# Patient Record
Sex: FEMALE | Race: WHITE | Employment: UNEMPLOYED | URBAN - METROPOLITAN AREA
[De-identification: names, ages, dates, MRNs, and addresses within clinical notes are randomized per-mention and may not be internally consistent; named-entity substitution may affect disease eponyms.]

---

## 2017-02-10 ENCOUNTER — HOSPITAL ENCOUNTER (OUTPATIENT)
Dept: MAMMOGRAPHY | Age: 52
Discharge: HOME | End: 2017-02-10
Attending: NURSE PRACTITIONER
Payer: MEDICAID

## 2017-02-10 DIAGNOSIS — N60.11 FIBROCYSTIC BREAST CHANGES OF BOTH BREASTS: ICD-10-CM

## 2017-02-10 DIAGNOSIS — N60.12 FIBROCYSTIC BREAST CHANGES OF BOTH BREASTS: ICD-10-CM

## 2017-02-10 DIAGNOSIS — Z98.82 H/O BILATERAL BREAST IMPLANTS: ICD-10-CM

## 2017-02-10 DIAGNOSIS — Z80.3 FAMILY HISTORY OF BREAST CANCER: ICD-10-CM

## 2017-02-10 PROCEDURE — 77067 SCR MAMMO BI INCL CAD: CPT

## 2017-03-02 ENCOUNTER — TELEPHONE (OUTPATIENT)
Dept: SURGERY | Age: 52
End: 2017-03-02

## 2017-03-02 NOTE — TELEPHONE ENCOUNTER
Patient called and left message on nurse voicemail asking for her 2/10/17 mammogram results. I called her back to let her know this was normal. She is requesting me to fax a copy to her obgyn, Dr. Krissy Amezcua. Fax #[phone]. I faxed a copy and received confirmation.

## 2017-08-31 ENCOUNTER — OFFICE VISIT (OUTPATIENT)
Dept: SURGERY | Age: 52
End: 2017-08-31

## 2017-08-31 VITALS
SYSTOLIC BLOOD PRESSURE: 157 MMHG | HEIGHT: 67 IN | WEIGHT: 152 LBS | BODY MASS INDEX: 23.86 KG/M2 | HEART RATE: 74 BPM | DIASTOLIC BLOOD PRESSURE: 103 MMHG

## 2017-08-31 DIAGNOSIS — Z80.3 FAMILY HISTORY OF BREAST CANCER: ICD-10-CM

## 2017-08-31 DIAGNOSIS — Z98.82 H/O BILATERAL BREAST IMPLANTS: ICD-10-CM

## 2017-08-31 DIAGNOSIS — N60.11 FIBROCYSTIC BREAST CHANGES OF BOTH BREASTS: Primary | ICD-10-CM

## 2017-08-31 DIAGNOSIS — N60.12 FIBROCYSTIC BREAST CHANGES OF BOTH BREASTS: Primary | ICD-10-CM

## 2017-08-31 NOTE — PROGRESS NOTES
HISTORY OF PRESENT ILLNESS  Pedro Lim is a 46 y.o. female. HPI  ESTABLISHED patient here today for high risk follow up due to family history of breast cancer. Denies any breast problems at this time.      Breast history-  - BILATERAL breast implants    FH includes-  Mother diagnosed with breast cancer at age 62, survivor. Maternal aunt diagnosed with breast cancer at age 61, . Maternal grandmother diagnosed with breast cancer at age [de-identified], . Patient has had genetic testing- BRCA negative. 2016- calculated risk using the Tyrer Cuzick model. Her 10 year risk is 7%(compared with a population risk of 2.7%). Her lifetime risk is 27.7% (compared with a population risk of 11.4%). OB History      Para Term  AB Living    2 2        SAB TAB Ectopic Molar Multiple Live Births                 Obstetric Comments    Menarche:  13. LMP: current. # of Children:  2. Age at Delivery of First Child:  35.   Hysterectomy/oophorectomy:  NO/NO. Breast Bx:  no.  Hx of Breast Feeding:  yes. BCP:  Yes, IUD. Hormone therapy:  no.             Past Surgical History:   Procedure Laterality Date    BREAST SURGERY PROCEDURE UNLISTED      IMPLANTS    HX OTHER SURGICAL      spinal fusion    IMPLANT BREAST SILICONE/EQ Bilateral          Recent imaging-  Bilateral screening mammogram on 2/10/17- BIRADS 2  IMPRESSION:  BI-RADS 2: Benign. No mammographic evidence of malignancy. Bilateral breast  implants remain intact. Next screening mammogram is recommended in one year. The patient will be  notified of these results. ROS    Physical Exam   Constitutional: She appears well-developed and well-nourished. Pulmonary/Chest: Right breast exhibits no inverted nipple, no mass, no nipple discharge, no skin change and no tenderness. Left breast exhibits no inverted nipple, no mass, no nipple discharge, no skin change and no tenderness.  Breasts are symmetrical.   Musculoskeletal: Normal range of motion. UE x 2   Lymphadenopathy:     She has no cervical adenopathy. She has no axillary adenopathy. Right: No supraclavicular adenopathy present. Left: No supraclavicular adenopathy present. Skin: Skin is warm, dry and intact. Psychiatric: She has a normal mood and affect. Her speech is normal and behavior is normal.     Visit Vitals    BP (!) 157/103    Pulse 74    Ht 5' 7\" (1.702 m)    Wt 152 lb (68.9 kg)    BMI 23.81 kg/m2     ASSESSMENT and PLAN  Encounter Diagnoses   Name Primary?  Fibrocystic breast changes of both breasts Yes    Family history of breast cancer     H/O bilateral breast implants      Normal exam and imaging with no evidence of breast malignancy. She will continue to have annual mammograms. She will not have screening MRIs, but will have additional breast imaging PRN. She will plan to have a BSmammogram in 2/2018 and RTC here in 1 year. She inquired about a derm recommendation - given the names of Dr. Merced Gonzalez and Dr. Morris Backers.

## 2017-08-31 NOTE — PATIENT INSTRUCTIONS
Breast Self-Exam: Care Instructions  Your Care Instructions  A breast self-exam is when you check your breasts for lumps or changes. This regular exam helps you learn how your breasts normally look and feel. Most breast problems or changes are not because of cancer. Breast self-exam is not a substitute for a mammogram. Having regular breast exams by your doctor and regular mammograms improve your chances of finding any problems with your breasts. Some women set a time each month to do a step-by-step breast self-exam. Other women like a less formal system. They might look at their breasts as they brush their teeth, or feel their breasts once in a while in the shower. If you notice a change in your breast, tell your doctor. Follow-up care is a key part of your treatment and safety. Be sure to make and go to all appointments, and call your doctor if you are having problems. Its also a good idea to know your test results and keep a list of the medicines you take. How do you do a breast self-exam?  · The best time to examine your breasts is usually one week after your menstrual period begins. Your breasts should not be tender then. If you do not have periods, you might do your exam on a day of the month that is easy to remember. · To examine your breasts:  ¨ Remove all your clothes above the waist and lie down. When you are lying down, your breast tissue spreads evenly over your chest wall, which makes it easier to feel all your breast tissue. ¨ Use the pads--not the fingertips--of the 3 middle fingers of your left hand to check your right breast. Move your fingers slowly in small coin-sized circles that overlap. ¨ Use three levels of pressure to feel of all your breast tissue. Use light pressure to feel the tissue close to the skin surface. Use medium pressure to feel a little deeper. Use firm pressure to feel your tissue close to your breastbone and ribs.  Use each pressure level to feel your breast tissue before moving on to the next spot. ¨ Check your entire breast, moving up and down as if following a strip from the collarbone to the bra line, and from the armpit to the ribs. Repeat until you have covered the entire breast.  ¨ Repeat this procedure for your left breast, using the pads of the 3 middle fingers of your right hand. · To examine your breasts while in the shower:  ¨ Place one arm over your head and lightly soap your breast on that side. ¨ Using the pads of your fingers, gently move your hand over your breast (in the strip pattern described above), feeling carefully for any lumps or changes. ¨ Repeat for the other breast.  · Have your doctor inspect anything you notice to see if you need further testing. Where can you learn more? Go to http://pau-olivia.info/. Enter P148 in the search box to learn more about \"Breast Self-Exam: Care Instructions. \"  Current as of: July 26, 2016  Content Version: 11.3  © 1733-9573 [employer]. Care instructions adapted under license by [employer] (which disclaims liability or warranty for this information). If you have questions about a medical condition or this instruction, always ask your healthcare professional. [place] [address] any warranty or liability for your use of this information.

## 2017-08-31 NOTE — PROGRESS NOTES
HISTORY OF PRESENT ILLNESS  Lia Eduardo is a 46 y.o. female. HPI   ESTABLISHED patient here today for high risk follow up due to family history of breast cancer. Denies any breast problems at this time.      Breast history-  - BILATERAL breast implants    FH includes-  Mother diagnosed with breast cancer at age 62, survivor. Maternal aunt diagnosed with breast cancer at age 61, . Maternal grandmother diagnosed with breast cancer at age [de-identified], . Patient has had genetic testing- BRCA negative. 2016- calculated risk using the Tyrer Cuzick model. Her 10 year risk is 7%(compared with a population risk of 2.7%). Her lifetime risk is 27.7% (compared with a population risk of 11.4%). Recent imaging-  Bilateral screening mammogram on 2/10/17- BIRADS 2  IMPRESSION:  BI-RADS 2: Benign. No mammographic evidence of malignancy. Bilateral breast  implants remain intact. Next screening mammogram is recommended in one year. The patient will be  notified of these results.     ROS    Physical Exam    ASSESSMENT and PLAN  {ASSESSMENT/PLAN:05721}

## 2018-08-27 ENCOUNTER — OFFICE VISIT (OUTPATIENT)
Dept: GYNECOLOGY | Age: 53
End: 2018-08-27

## 2018-08-27 VITALS
DIASTOLIC BLOOD PRESSURE: 102 MMHG | SYSTOLIC BLOOD PRESSURE: 167 MMHG | WEIGHT: 158.6 LBS | BODY MASS INDEX: 24.89 KG/M2 | HEART RATE: 66 BPM | HEIGHT: 67 IN

## 2018-08-27 DIAGNOSIS — N83.8 OVARIAN MASS, RIGHT: Primary | ICD-10-CM

## 2018-08-27 DIAGNOSIS — Z87.42 HISTORY OF ABNORMAL CERVICAL PAP SMEAR: ICD-10-CM

## 2018-08-27 NOTE — PROGRESS NOTES
New patient referred by Dr. Bora Benoit for right adnexal mass, pt complains of discomfort since the exam and ultrasound, pt is unable to sleep and it is hard to walk, Initial blood pressure reading 178/100, repeat blood pressure reading 167/102      1. Have you been to the ER, urgent care clinic since your last visit? Hospitalized since your last visit?  no    2. Have you seen or consulted any other health care providers outside of the [address] [place] [address] since your last visit? Include any pap smears or colon screening.    Yes, Dr. Bora Benoit

## 2018-08-27 NOTE — PATIENT INSTRUCTIONS
Fyreball Activation    Thank you for requesting access to Fyreball. Please follow the instructions below to securely access and download your online medical record. Fyreball allows you to send messages to your doctor, view your test results, renew your prescriptions, schedule appointments, and more. How Do I Sign Up? 1. In your internet browser, go to [email or web address]. 2. Click on the First Time User? Click Here link in the Sign In box. You will see the New Member Sign Up page. 3. Enter your Fyreball Access Code exactly as it appears below. You will not need to use this code after youve completed the sign-up process. If you do not sign up before the expiration date, you must request a new code. Fyreball Access Code: [email or web address]  Expires: 2018 10:47 AM (This is the date your Fyreball access code will )    4. Enter the last four digits of your Social Security Number (xxxx) and Date of Birth (mm/dd/yyyy) as indicated and click Submit. You will be taken to the next sign-up page. 5. Create a Fyreball ID. This will be your Fyreball login ID and cannot be changed, so think of one that is secure and easy to remember. 6. Create a Fyreball password. You can change your password at any time. 7. Enter your Password Reset Question and Answer. This can be used at a later time if you forget your password. 8. Enter your e-mail address. You will receive e-mail notification when new information is available in [address]. 9. Click Sign Up. You can now view and download portions of your medical record. 10. Click the Download Summary menu link to download a portable copy of your medical information. Additional Information    If you have questions, please visit the Frequently Asked Questions section of the Fyreball website at [email or web address] Remember, Fyreball is NOT to be used for urgent needs. For medical emergencies, dial 911.

## 2018-08-27 NOTE — PROGRESS NOTES
[address] [place] [address] [address] [facility]  P [phone]  F [phone]    Office Note  Patient ID:  Name:  Yadira Young  MRN:  [id number]  :  1965/53 y.o. Date:  2018      HISTORY OF PRESENT ILLNESS:  Yadira Young is a 48 y.o.  perimenopausal female who presents in consultation for enlarging pelvic mass. Pelvic ultrasound on 18 demonstrated a 16.3 x 7.8 x 10.0cm right ovarian mass with \"multiple septations\". The patient originally presented to Dr. Laine Castillo reporting increasing RLQ pain and bloating on 18. Patient reports she had an episode of pelvic pain 2 weeks ago while she was on vacation, that was somewhat relieved by urination. However, she felt like something was just not right, and decided she needed to see her Gynecologist. She reports some weight gain over the last 3 years. Denies any vaginal bleeding for a number of years. Denies change in appetite, satiety, or bowel habits. Denies hematuria or hematochezia. Denies CP or SOB. Pertinent PMH/PSH: hypertension, history of spinal fusion      Active, no restrictions. ROS:  A comprehensive review of systems was negative except for that written in the History of Present Illness. , 10 point ROS    OB/GYN ROS:   Patient denies significant menstrual problems. Patient denies any abnormal bleeding or vaginal discharge.      ECOG ndGndrndanddndend:nd nd2nd Problem List:  Patient Active Problem List    Diagnosis Date Noted    Ovarian mass, right 2018    History of abnormal cervical Pap smear 2018    Scoliosis     Chronic pain     Arthritis      PMH:  Past Medical History:   Diagnosis Date    Arthritis     Chronic pain     Nausea & vomiting     Nerve compression     neck and left arm    Ovarian cyst     Pap smear abnormality of cervix 2008    + HPV, laser treatment for cervix in , colpo  negative    Pap smear abnormality of cervix 2009    +HPV    Pap smear abnormality of cervix 09/13/2010    +HPV (16,18 negative)    Pap smear abnormality of cervix 01/2011    ASCUS, HPV +, repap in 8/11 and 10/13 - WNL    Psychiatric disorder     Anxiety    Scoliosis       PSH:  Past Surgical History:   Procedure Laterality Date    BREAST SURGERY PROCEDURE UNLISTED  1997    IMPLANTS    CKC, AKA COLD KNIFE CONE  1992    HX GYN  2008    laser treatment of cervix    HX OTHER SURGICAL  2012    spinal fusion    HX WISDOM TEETH EXTRACTION      IMPLANT BREAST SILICONE/EQ Bilateral     1997      Social History:  Social History   Substance Use Topics    Smoking status: Former Smoker    Smokeless tobacco: Never Used    Alcohol use 1.8 oz/week     3 Standard drinks or equivalent per week      Comment: OCCASIONALLY      Family History:  Family History   Problem Relation Age of Onset    Cancer Mother     Breast Cancer Mother 64    Cancer Father     Seizures Sister     Breast Cancer Maternal Grandmother      [de-identified]    Breast Cancer Maternal Aunt      60's      Medications: (reviewed)  Current Outpatient Prescriptions   Medication Sig    ibuprofen (ADVIL PO) Take  by mouth. No current facility-administered medications for this visit. Allergies: (reviewed)  No Known Allergies     Gyn History:   Last pap: normal 11/2015  History of abnormal pap: yes, last abnormal in 1/2011 (ASCUS, HPV +); f/u 8/2011 and 10/2013 normal  History of STI: yes, HPV  Menses: IUD in place  Contraception: Mirena IUD (1/28/11 - keeping in until menopause)    OBJECTIVE:    Physical Exam:  VITAL SIGNS: Vitals:    08/27/18 1047 08/27/18 1052   BP: (!) 178/100 (!) 167/102   Pulse: 68 66   Weight: 158 lb 9.6 oz (71.9 kg)    Height: 5' 7\" (1.702 m)      Body mass index is 24.84 kg/(m^2). GENERAL AYAD: Conversant, alert, oriented. No acute distress. HEENT: HEENT. No thyroid enlargement. No JVD. Neck: Supple without restrictions. RESPIRATORY: Clear to auscultation and percussion to the bases.  No CVAT. CARDIOVASC: RRR without murmur/rub. GASTROINT: soft, nondistended, no masses palpated, without organomegaly, slight tenderness; patient self guarding prior to even touching her abdomen   MUSCULOSKEL: no joint tenderness, deformity or swelling       EXTREMITIES: extremities normal, atraumatic, no cyanosis or edema   PELVIC: Vulva and vagina appear normal. Normal appearing cervix with IUD strings visualized. Bimanual exam reveals normal uterus. Patient largely intolerant of bimanual exam, thus could not palpate mass. RECTAL: deferred   VICTORIA SURVEY: No suspicious lymphadenopathy or edema noted. NEURO: Grossly intact. No acute deficit. Lab Date as available:  Ca-125 (8/23/18) = 12.6    Imaging:  Pelvic ultrasound 8/23/18:   Impression: normal sized post emnopausal uterus with correctly positioned IUD. Large ~14cm complex right adnexa mass with multiple septations, no free fluid or ascites noted, left adnexae appears normal.    IMPRESSION/PLAN:    Vinay Flaherty is a 48 y.o. female with a working diagnosis of complex right ovarian mass; normal Ca-125 (12.6)    Problems:     Patient Active Problem List    Diagnosis Date Noted    Ovarian mass, right 08/27/2018    History of abnormal cervical Pap smear 08/27/2018    Scoliosis     Chronic pain     Arthritis        I reviewed Ms. Vinay Flaherty course to date, including her medical records, recent studies, physical exam, and review of symptoms. Given complex appearance and size, recommend surgical removal. Discussed laparoscopic versus open surgery. Will plan for CT A/P for further evaluation and to help aid in decision regarding laparoscopic versus open surgery. If appears amenable to laparoscopy, will plan for diagnostic laparoscopy with USO versus BSO with frozen pathology.  If frozen pathology consistent with borderline or malignancy, will plan on staging as indicated including TLH/BSO, possible pelvic/para-aortic LND, possible omentectomy, possible debulking as indicated. If benign, reviewed the role of contralateral ovarian preservation. Reviewed cardiovascular, bone, and colon health with ovarian preservation. However, given the patient's age (48 years old), this added benefit may be low. The patient is not at increased risk of endometrial pathology in the future compared to the general population, although I discussed the possibility of a hysterectomy at the time of surgery even if the mass is benign. At this time the patient is unsure about proceeding with a hysterectomy and BSO at the time of removing the right ovarian mass. It is reasonable to perform a TLH/BSO even if the mass is benign given her age and history of abnormal pap smears. Will readdress again on the day of surgery. Will collect CEA today as well, along with CBCD, CMP, EKG, CXR, and CT A/P. If CEA is elevated, will consider colonoscopy prior to surgery; otherwise, recommend colonscopy after recovery. Posted for diagnostic laparoscopy, USO, etc on 9/6/18. All questions and concerns were addressed with the patient and she is comfortable with the plan.       Defined Sensitive Document    >50% of total time allocated to visit dedicated to counseling, 60 minutes total.    Signed By: Hussein Rushing MD     8/27/2018/10:47 AM

## 2018-08-27 NOTE — LETTER
8/27/2018 11:59 AM 
 
Patient:  Neva Driscoll YOB: 1965 Date of Visit: 8/27/2018 Ольга Oh MD 
[address] [facility] [address] VIA In Basket Dear Ольга Oh MD, Thank you for referring Ms. Neva Driscoll to [address] [place] [address] for evaluation and treatment. Below are the relevant portions of my assessment and plan of care. Ms. Neva Driscoll has a complex appearing, 16-cm right ovarian mass with a normal Ca-125. I have recommended surgical removal. I have posted her for surgery on Thursday, September 6, 2018. I am obtaining a CT abdomen/pelvis prior to surgery, along with a CEA. I am planning on performing a diagnostic laparoscopy with USO and possible staging if a borderline tumor or malignant tumor are present on frozen pathology. I have also offered the patient a hysterectomy and BSO even if the mass is benign, and the patient is discussing this with her family. Thank you very much for your referral of Ms. Neva Driscoll. If you have questions, please do not hesitate to call me. I look forward to following Ms. Marveen Homans along with you and will keep you updated as to her progress.   
 
 
 
 
Sincerely, 
 
 
Jolanda Oppenheim, MD

## 2018-08-29 ENCOUNTER — HOSPITAL ENCOUNTER (OUTPATIENT)
Dept: PREADMISSION TESTING | Age: 53
Discharge: HOME | End: 2018-08-29
Payer: MEDICAID

## 2018-08-29 ENCOUNTER — TELEPHONE (OUTPATIENT)
Dept: SURGERY | Age: 53
End: 2018-08-29

## 2018-08-29 VITALS
BODY MASS INDEX: 24.48 KG/M2 | HEIGHT: 67 IN | WEIGHT: 156 LBS | SYSTOLIC BLOOD PRESSURE: 127 MMHG | TEMPERATURE: 98.3 F | DIASTOLIC BLOOD PRESSURE: 76 MMHG | HEART RATE: 62 BPM

## 2018-08-29 LAB
ABO + RH BLD: NORMAL
ALBUMIN SERPL-MCNC: 4 G/DL (ref 3.5–5)
ALBUMIN/GLOB SERPL: 1.1 {RATIO} (ref 1.1–2.2)
ALP SERPL-CCNC: 67 U/L (ref 45–117)
ALT SERPL-CCNC: 22 U/L (ref 12–78)
ANION GAP SERPL CALC-SCNC: 6 MMOL/L (ref 5–15)
AST SERPL-CCNC: 15 U/L (ref 15–37)
BASOPHILS # BLD: 0 K/UL (ref 0–0.1)
BASOPHILS NFR BLD: 0 % (ref 0–1)
BILIRUB SERPL-MCNC: 0.3 MG/DL (ref 0.2–1)
BLOOD GROUP ANTIBODIES SERPL: NORMAL
BUN SERPL-MCNC: 18 MG/DL (ref 6–20)
BUN/CREAT SERPL: 24 (ref 12–20)
CALCIUM SERPL-MCNC: 9.5 MG/DL (ref 8.5–10.1)
CEA SERPL-MCNC: 0.5 NG/ML
CHLORIDE SERPL-SCNC: 106 MMOL/L (ref 97–108)
CO2 SERPL-SCNC: 25 MMOL/L (ref 21–32)
CREAT SERPL-MCNC: 0.74 MG/DL (ref 0.55–1.02)
DIFFERENTIAL METHOD BLD: NORMAL
EOSINOPHIL # BLD: 0.1 K/UL (ref 0–0.4)
EOSINOPHIL NFR BLD: 2 % (ref 0–7)
ERYTHROCYTE [DISTWIDTH] IN BLOOD BY AUTOMATED COUNT: 12.8 % (ref 11.5–14.5)
GLOBULIN SER CALC-MCNC: 3.7 G/DL (ref 2–4)
GLUCOSE SERPL-MCNC: 88 MG/DL (ref 65–100)
HCT VFR BLD AUTO: 38.2 % (ref 35–47)
HGB BLD-MCNC: 12.5 G/DL (ref 11.5–16)
IMM GRANULOCYTES # BLD: 0 K/UL (ref 0–0.04)
IMM GRANULOCYTES NFR BLD AUTO: 0 % (ref 0–0.5)
LYMPHOCYTES # BLD: 1.2 K/UL (ref 0.8–3.5)
LYMPHOCYTES NFR BLD: 23 % (ref 12–49)
MCH RBC QN AUTO: 31.6 PG (ref 26–34)
MCHC RBC AUTO-ENTMCNC: 32.7 G/DL (ref 30–36.5)
MCV RBC AUTO: 96.5 FL (ref 80–99)
MONOCYTES # BLD: 0.3 K/UL (ref 0–1)
MONOCYTES NFR BLD: 7 % (ref 5–13)
NEUTS SEG # BLD: 3.5 K/UL (ref 1.8–8)
NEUTS SEG NFR BLD: 67 % (ref 32–75)
NRBC # BLD: 0 K/UL (ref 0–0.01)
NRBC BLD-RTO: 0 PER 100 WBC
PLATELET # BLD AUTO: 281 K/UL (ref 150–400)
PMV BLD AUTO: 9.1 FL (ref 8.9–12.9)
POTASSIUM SERPL-SCNC: 4.3 MMOL/L (ref 3.5–5.1)
PROT SERPL-MCNC: 7.7 G/DL (ref 6.4–8.2)
RBC # BLD AUTO: 3.96 M/UL (ref 3.8–5.2)
SODIUM SERPL-SCNC: 137 MMOL/L (ref 136–145)
SPECIMEN EXP DATE BLD: NORMAL
WBC # BLD AUTO: 5.1 K/UL (ref 3.6–11)

## 2018-08-29 PROCEDURE — 36415 COLL VENOUS BLD VENIPUNCTURE: CPT | Performed by: OBSTETRICS & GYNECOLOGY

## 2018-08-29 PROCEDURE — 82378 CARCINOEMBRYONIC ANTIGEN: CPT | Performed by: OBSTETRICS & GYNECOLOGY

## 2018-08-29 PROCEDURE — 86900 BLOOD TYPING SEROLOGIC ABO: CPT | Performed by: OBSTETRICS & GYNECOLOGY

## 2018-08-29 PROCEDURE — 85025 COMPLETE CBC W/AUTO DIFF WBC: CPT | Performed by: OBSTETRICS & GYNECOLOGY

## 2018-08-29 PROCEDURE — 80053 COMPREHEN METABOLIC PANEL: CPT | Performed by: OBSTETRICS & GYNECOLOGY

## 2018-08-29 PROCEDURE — 93005 ELECTROCARDIOGRAM TRACING: CPT

## 2018-08-29 NOTE — TELEPHONE ENCOUNTER
Patient called to notify our office that she is currently being treated for an ovarian mass. Has surgery scheduled for next week. Everything is in [facility]. She apologized for canceling her appointment with Radha Gregg NP, for tomorrow 8/30/18, but wanted to know how she should follow up with her breast/high risk screening. She is aware that she is overdue for a mammogram.    I explained to the patient that I will discuss with Leland Ortez NP, and call the patient back tomorrow with an update/plan. The patient is very appreciative.

## 2018-08-29 NOTE — PERIOP NOTES
PREOPERATIVE INSTRUCTIONS REVIEWED WITH PATIENT. PATIENT GIVEN TWO-- SIX PACKS OF CHG WIPES. INSTRUCTIONS REVIEWED ON USE OF CHG WIPES. PATIENT GIVEN SSI INFECTIONS SHEET. PATIENT WAS GIVEN THE OPPORTUNITY TO ASK QUESTIONS ON THE INFORMATION PROVIDED. PATIENT VERBALIZES UNDERSTANDING OF PREOP PREP:  BOWEL PREP PER DR Hua Pal.

## 2018-08-30 LAB
ATRIAL RATE: 55 BPM
CALCULATED P AXIS, ECG09: 18 DEGREES
CALCULATED R AXIS, ECG10: 9 DEGREES
CALCULATED T AXIS, ECG11: 50 DEGREES
DIAGNOSIS, 93000: NORMAL
P-R INTERVAL, ECG05: 148 MS
Q-T INTERVAL, ECG07: 462 MS
QRS DURATION, ECG06: 78 MS
QTC CALCULATION (BEZET), ECG08: 441 MS
VENTRICULAR RATE, ECG03: 55 BPM

## 2018-08-30 NOTE — TELEPHONE ENCOUNTER
I called the patient and gave her this information. She voiced understanding and is happy with this plan.

## 2018-08-31 ENCOUNTER — HOSPITAL ENCOUNTER (OUTPATIENT)
Dept: CT IMAGING | Age: 53
Discharge: HOME | End: 2018-08-31
Attending: OBSTETRICS & GYNECOLOGY
Payer: MEDICAID

## 2018-08-31 ENCOUNTER — TELEPHONE (OUTPATIENT)
Dept: GYNECOLOGY | Age: 53
End: 2018-08-31

## 2018-08-31 DIAGNOSIS — N83.8 OVARIAN MASS, RIGHT: ICD-10-CM

## 2018-08-31 PROCEDURE — 74011000255 HC RX REV CODE- 255: Performed by: OBSTETRICS & GYNECOLOGY

## 2018-08-31 PROCEDURE — 74177 CT ABD & PELVIS W/CONTRAST: CPT

## 2018-08-31 PROCEDURE — 74011250636 HC RX REV CODE- 250/636: Performed by: OBSTETRICS & GYNECOLOGY

## 2018-08-31 PROCEDURE — 74011636320 HC RX REV CODE- 636/320: Performed by: OBSTETRICS & GYNECOLOGY

## 2018-08-31 RX ORDER — BARIUM SULFATE 20 MG/ML
900 SUSPENSION ORAL
Status: COMPLETED | OUTPATIENT
Start: 2018-08-31 | End: 2018-08-31

## 2018-08-31 RX ORDER — SODIUM CHLORIDE 9 MG/ML
50 INJECTION, SOLUTION INTRAVENOUS
Status: COMPLETED | OUTPATIENT
Start: 2018-08-31 | End: 2018-08-31

## 2018-08-31 RX ORDER — SODIUM CHLORIDE 0.9 % (FLUSH) 0.9 %
10 SYRINGE (ML) INJECTION
Status: COMPLETED | OUTPATIENT
Start: 2018-08-31 | End: 2018-08-31

## 2018-08-31 RX ADMIN — Medication 10 ML: at 13:34

## 2018-08-31 RX ADMIN — IOPAMIDOL 100 ML: 755 INJECTION, SOLUTION INTRAVENOUS at 13:33

## 2018-08-31 RX ADMIN — SODIUM CHLORIDE 50 ML/HR: 900 INJECTION, SOLUTION INTRAVENOUS at 13:34

## 2018-08-31 RX ADMIN — BARIUM SULFATE 900 ML: 21 SUSPENSION ORAL at 13:34

## 2018-08-31 NOTE — TELEPHONE ENCOUNTER
Please call pt regarding her CT scan results, she would like to have them discussed before her surgery on 9/6/18. She may be reached at [phone].

## 2018-09-04 NOTE — TELEPHONE ENCOUNTER
I spoke with Ms. Abdirahman De Los Snatos today regarding her CT results. Her CT confirms her complex pelvic mass, but does not demonstrate any other concerning abdominal findings. Will proceed with diagnostic laparoscopy and laparoscopic removal of the mass. Further discussed the role of hysterectomy and BSO. Reviewed the risks and benefits of hysterectomy and ovarian preservation. At this time, the patient would like to move forward with just the removal of the mass. If frozen pathology is consistent with borderline or malignancy, will plan to move forward with TLH/BSO/staging. If it is benign, will plan to move forward with just laparoscopic USO/removal of pelvic mass. The patient was thankful for our call.      Mary Lou Grullon MD

## 2018-09-05 ENCOUNTER — ANESTHESIA EVENT (OUTPATIENT)
Dept: SURGERY | Age: 53
End: 2018-09-05
Payer: MEDICAID

## 2018-09-06 ENCOUNTER — ANESTHESIA (OUTPATIENT)
Dept: SURGERY | Age: 53
End: 2018-09-06
Payer: MEDICAID

## 2018-09-06 ENCOUNTER — HOSPITAL ENCOUNTER (OUTPATIENT)
Age: 53
Setting detail: OUTPATIENT SURGERY
Discharge: HOME | End: 2018-09-06
Attending: OBSTETRICS & GYNECOLOGY | Admitting: OBSTETRICS & GYNECOLOGY
Payer: MEDICAID

## 2018-09-06 VITALS
DIASTOLIC BLOOD PRESSURE: 65 MMHG | BODY MASS INDEX: 24.48 KG/M2 | SYSTOLIC BLOOD PRESSURE: 119 MMHG | TEMPERATURE: 97.1 F | WEIGHT: 156 LBS | HEART RATE: 65 BPM | OXYGEN SATURATION: 99 % | HEIGHT: 67 IN | RESPIRATION RATE: 12 BRPM

## 2018-09-06 DIAGNOSIS — G89.18 POSTOPERATIVE PAIN: Primary | ICD-10-CM

## 2018-09-06 PROBLEM — D27.0 CYSTADENOMA OF RIGHT OVARY: Status: ACTIVE | Noted: 2018-09-06

## 2018-09-06 LAB — HCG UR QL: NEGATIVE

## 2018-09-06 PROCEDURE — 77030002933 HC SUT MCRYL J&J -A: Performed by: OBSTETRICS & GYNECOLOGY

## 2018-09-06 PROCEDURE — 74011250636 HC RX REV CODE- 250/636: Performed by: ANESTHESIOLOGY

## 2018-09-06 PROCEDURE — 77030035048 HC TRCR ENDOSC OPTCL COVD -B: Performed by: OBSTETRICS & GYNECOLOGY

## 2018-09-06 PROCEDURE — 77030008684 HC TU ET CUF COVD -B: Performed by: ANESTHESIOLOGY

## 2018-09-06 PROCEDURE — 74011250636 HC RX REV CODE- 250/636: Performed by: OBSTETRICS & GYNECOLOGY

## 2018-09-06 PROCEDURE — 76210000016 HC OR PH I REC 1 TO 1.5 HR: Performed by: OBSTETRICS & GYNECOLOGY

## 2018-09-06 PROCEDURE — 74011250636 HC RX REV CODE- 250/636

## 2018-09-06 PROCEDURE — 74011000250 HC RX REV CODE- 250: Performed by: OBSTETRICS & GYNECOLOGY

## 2018-09-06 PROCEDURE — 77030020263 HC SOL INJ SOD CL0.9% LFCR 1000ML: Performed by: OBSTETRICS & GYNECOLOGY

## 2018-09-06 PROCEDURE — 88307 TISSUE EXAM BY PATHOLOGIST: CPT | Performed by: OBSTETRICS & GYNECOLOGY

## 2018-09-06 PROCEDURE — 77030035045 HC TRCR ENDOSC VRSPRT BLDLSS COVD -B: Performed by: OBSTETRICS & GYNECOLOGY

## 2018-09-06 PROCEDURE — 88112 CYTOPATH CELL ENHANCE TECH: CPT | Performed by: OBSTETRICS & GYNECOLOGY

## 2018-09-06 PROCEDURE — 81025 URINE PREGNANCY TEST: CPT

## 2018-09-06 PROCEDURE — 77030034696 HC CATH URETH FOL 2W BARD -A: Performed by: OBSTETRICS & GYNECOLOGY

## 2018-09-06 PROCEDURE — 77030011640 HC PAD GRND REM COVD -A: Performed by: OBSTETRICS & GYNECOLOGY

## 2018-09-06 PROCEDURE — 88331 PATH CONSLTJ SURG 1 BLK 1SPC: CPT | Performed by: OBSTETRICS & GYNECOLOGY

## 2018-09-06 PROCEDURE — 76210000021 HC REC RM PH II 0.5 TO 1 HR: Performed by: OBSTETRICS & GYNECOLOGY

## 2018-09-06 PROCEDURE — 76060000034 HC ANESTHESIA 1.5 TO 2 HR: Performed by: OBSTETRICS & GYNECOLOGY

## 2018-09-06 PROCEDURE — 77030002903 HC SUT DEV PLCMNT LSIS -C: Performed by: OBSTETRICS & GYNECOLOGY

## 2018-09-06 PROCEDURE — 74011000258 HC RX REV CODE- 258: Performed by: OBSTETRICS & GYNECOLOGY

## 2018-09-06 PROCEDURE — 77030033639 HC SHR ENDO COAG HARM 36 J&J -E: Performed by: OBSTETRICS & GYNECOLOGY

## 2018-09-06 PROCEDURE — 74011000250 HC RX REV CODE- 250

## 2018-09-06 PROCEDURE — 77030002882 HC SUT CART KNOT LSIS -B: Performed by: OBSTETRICS & GYNECOLOGY

## 2018-09-06 PROCEDURE — 76010000153 HC OR TIME 1.5 TO 2 HR: Performed by: OBSTETRICS & GYNECOLOGY

## 2018-09-06 PROCEDURE — 77030032490 HC SLV COMPR SCD KNE COVD -B: Performed by: OBSTETRICS & GYNECOLOGY

## 2018-09-06 PROCEDURE — 77030018836 HC SOL IRR NACL ICUM -A: Performed by: OBSTETRICS & GYNECOLOGY

## 2018-09-06 PROCEDURE — 77030035051: Performed by: OBSTETRICS & GYNECOLOGY

## 2018-09-06 PROCEDURE — 74011250637 HC RX REV CODE- 250/637: Performed by: ANESTHESIOLOGY

## 2018-09-06 PROCEDURE — 77030026438 HC STYL ET INTUB CARD -A: Performed by: ANESTHESIOLOGY

## 2018-09-06 PROCEDURE — 77030039266 HC ADH SKN EXOFIN S2SG -A: Performed by: OBSTETRICS & GYNECOLOGY

## 2018-09-06 PROCEDURE — 77030008756 HC TU IRR SUC STRY -B: Performed by: OBSTETRICS & GYNECOLOGY

## 2018-09-06 PROCEDURE — 77030011992 HC AIRWY NASOPHGL TELE -A: Performed by: ANESTHESIOLOGY

## 2018-09-06 RX ORDER — ENOXAPARIN SODIUM 100 MG/ML
40 INJECTION SUBCUTANEOUS
Status: COMPLETED | OUTPATIENT
Start: 2018-09-06 | End: 2018-09-06

## 2018-09-06 RX ORDER — ONDANSETRON 2 MG/ML
4 INJECTION INTRAMUSCULAR; INTRAVENOUS AS NEEDED
Status: DISCONTINUED | OUTPATIENT
Start: 2018-09-06 | End: 2018-09-06 | Stop reason: HOSPADM

## 2018-09-06 RX ORDER — DIPHENHYDRAMINE HYDROCHLORIDE 50 MG/ML
12.5 INJECTION, SOLUTION INTRAMUSCULAR; INTRAVENOUS AS NEEDED
Status: DISCONTINUED | OUTPATIENT
Start: 2018-09-06 | End: 2018-09-06 | Stop reason: HOSPADM

## 2018-09-06 RX ORDER — PROPOFOL 10 MG/ML
INJECTION, EMULSION INTRAVENOUS AS NEEDED
Status: DISCONTINUED | OUTPATIENT
Start: 2018-09-06 | End: 2018-09-06 | Stop reason: HOSPADM

## 2018-09-06 RX ORDER — FENTANYL CITRATE 50 UG/ML
INJECTION, SOLUTION INTRAMUSCULAR; INTRAVENOUS AS NEEDED
Status: DISCONTINUED | OUTPATIENT
Start: 2018-09-06 | End: 2018-09-06 | Stop reason: HOSPADM

## 2018-09-06 RX ORDER — ROCURONIUM BROMIDE 10 MG/ML
INJECTION, SOLUTION INTRAVENOUS AS NEEDED
Status: DISCONTINUED | OUTPATIENT
Start: 2018-09-06 | End: 2018-09-06 | Stop reason: HOSPADM

## 2018-09-06 RX ORDER — SODIUM CHLORIDE 0.9 % (FLUSH) 0.9 %
5-10 SYRINGE (ML) INJECTION AS NEEDED
Status: DISCONTINUED | OUTPATIENT
Start: 2018-09-06 | End: 2018-09-06 | Stop reason: HOSPADM

## 2018-09-06 RX ORDER — ACETAMINOPHEN 325 MG/1
650 TABLET ORAL
Qty: 60 TAB | Refills: 1 | Status: SHIPPED | OUTPATIENT
Start: 2018-09-06 | End: 2018-12-13

## 2018-09-06 RX ORDER — SODIUM CHLORIDE, SODIUM LACTATE, POTASSIUM CHLORIDE, CALCIUM CHLORIDE 600; 310; 30; 20 MG/100ML; MG/100ML; MG/100ML; MG/100ML
INJECTION, SOLUTION INTRAVENOUS
Status: DISCONTINUED | OUTPATIENT
Start: 2018-09-06 | End: 2018-09-06 | Stop reason: HOSPADM

## 2018-09-06 RX ORDER — FENTANYL CITRATE 50 UG/ML
50 INJECTION, SOLUTION INTRAMUSCULAR; INTRAVENOUS AS NEEDED
Status: DISCONTINUED | OUTPATIENT
Start: 2018-09-06 | End: 2018-09-06 | Stop reason: HOSPADM

## 2018-09-06 RX ORDER — SODIUM CHLORIDE, SODIUM LACTATE, POTASSIUM CHLORIDE, CALCIUM CHLORIDE 600; 310; 30; 20 MG/100ML; MG/100ML; MG/100ML; MG/100ML
125 INJECTION, SOLUTION INTRAVENOUS CONTINUOUS
Status: DISCONTINUED | OUTPATIENT
Start: 2018-09-06 | End: 2018-09-06 | Stop reason: HOSPADM

## 2018-09-06 RX ORDER — MORPHINE SULFATE 1 MG/ML
INJECTION, SOLUTION EPIDURAL; INTRATHECAL; INTRAVENOUS
Status: COMPLETED
Start: 2018-09-06 | End: 2018-09-06

## 2018-09-06 RX ORDER — FENTANYL CITRATE 50 UG/ML
25 INJECTION, SOLUTION INTRAMUSCULAR; INTRAVENOUS
Status: COMPLETED | OUTPATIENT
Start: 2018-09-06 | End: 2018-09-06

## 2018-09-06 RX ORDER — MIDAZOLAM HYDROCHLORIDE 1 MG/ML
1 INJECTION, SOLUTION INTRAMUSCULAR; INTRAVENOUS
Status: DISCONTINUED | OUTPATIENT
Start: 2018-09-06 | End: 2018-09-06 | Stop reason: HOSPADM

## 2018-09-06 RX ORDER — DEXTROSE, SODIUM CHLORIDE, SODIUM LACTATE, POTASSIUM CHLORIDE, AND CALCIUM CHLORIDE 5; .6; .31; .03; .02 G/100ML; G/100ML; G/100ML; G/100ML; G/100ML
125 INJECTION, SOLUTION INTRAVENOUS CONTINUOUS
Status: DISCONTINUED | OUTPATIENT
Start: 2018-09-06 | End: 2018-09-06 | Stop reason: HOSPADM

## 2018-09-06 RX ORDER — MIDAZOLAM HYDROCHLORIDE 1 MG/ML
INJECTION, SOLUTION INTRAMUSCULAR; INTRAVENOUS AS NEEDED
Status: DISCONTINUED | OUTPATIENT
Start: 2018-09-06 | End: 2018-09-06 | Stop reason: HOSPADM

## 2018-09-06 RX ORDER — NEOSTIGMINE METHYLSULFATE 1 MG/ML
INJECTION INTRAVENOUS AS NEEDED
Status: DISCONTINUED | OUTPATIENT
Start: 2018-09-06 | End: 2018-09-06 | Stop reason: HOSPADM

## 2018-09-06 RX ORDER — IBUPROFEN 600 MG/1
600 TABLET ORAL EVERY 8 HOURS
Qty: 12 TAB | Refills: 0 | Status: SHIPPED | OUTPATIENT
Start: 2018-09-06 | End: 2018-09-10

## 2018-09-06 RX ORDER — MIDAZOLAM HYDROCHLORIDE 1 MG/ML
1 INJECTION, SOLUTION INTRAMUSCULAR; INTRAVENOUS AS NEEDED
Status: DISCONTINUED | OUTPATIENT
Start: 2018-09-06 | End: 2018-09-06 | Stop reason: HOSPADM

## 2018-09-06 RX ORDER — ONDANSETRON 2 MG/ML
INJECTION INTRAMUSCULAR; INTRAVENOUS AS NEEDED
Status: DISCONTINUED | OUTPATIENT
Start: 2018-09-06 | End: 2018-09-06 | Stop reason: HOSPADM

## 2018-09-06 RX ORDER — LIDOCAINE HYDROCHLORIDE 20 MG/ML
INJECTION, SOLUTION EPIDURAL; INFILTRATION; INTRACAUDAL; PERINEURAL AS NEEDED
Status: DISCONTINUED | OUTPATIENT
Start: 2018-09-06 | End: 2018-09-06 | Stop reason: HOSPADM

## 2018-09-06 RX ORDER — SODIUM CHLORIDE 0.9 % (FLUSH) 0.9 %
5-10 SYRINGE (ML) INJECTION EVERY 8 HOURS
Status: DISCONTINUED | OUTPATIENT
Start: 2018-09-06 | End: 2018-09-06 | Stop reason: HOSPADM

## 2018-09-06 RX ORDER — EPHEDRINE SULFATE 50 MG/ML
INJECTION, SOLUTION INTRAVENOUS AS NEEDED
Status: DISCONTINUED | OUTPATIENT
Start: 2018-09-06 | End: 2018-09-06 | Stop reason: HOSPADM

## 2018-09-06 RX ORDER — OXYCODONE HYDROCHLORIDE 5 MG/1
5 CAPSULE ORAL
Qty: 20 CAP | Refills: 0 | Status: SHIPPED | OUTPATIENT
Start: 2018-09-06 | End: 2018-12-13

## 2018-09-06 RX ORDER — DEXAMETHASONE SODIUM PHOSPHATE 4 MG/ML
INJECTION, SOLUTION INTRA-ARTICULAR; INTRALESIONAL; INTRAMUSCULAR; INTRAVENOUS; SOFT TISSUE AS NEEDED
Status: DISCONTINUED | OUTPATIENT
Start: 2018-09-06 | End: 2018-09-06 | Stop reason: HOSPADM

## 2018-09-06 RX ORDER — SUCCINYLCHOLINE CHLORIDE 20 MG/ML
INJECTION INTRAMUSCULAR; INTRAVENOUS AS NEEDED
Status: DISCONTINUED | OUTPATIENT
Start: 2018-09-06 | End: 2018-09-06 | Stop reason: HOSPADM

## 2018-09-06 RX ORDER — HYDROMORPHONE HYDROCHLORIDE 2 MG/ML
INJECTION, SOLUTION INTRAMUSCULAR; INTRAVENOUS; SUBCUTANEOUS AS NEEDED
Status: DISCONTINUED | OUTPATIENT
Start: 2018-09-06 | End: 2018-09-06 | Stop reason: HOSPADM

## 2018-09-06 RX ORDER — PHENYLEPHRINE HCL IN 0.9% NACL 0.4MG/10ML
SYRINGE (ML) INTRAVENOUS AS NEEDED
Status: DISCONTINUED | OUTPATIENT
Start: 2018-09-06 | End: 2018-09-06 | Stop reason: HOSPADM

## 2018-09-06 RX ORDER — MORPHINE SULFATE 10 MG/ML
2 INJECTION, SOLUTION INTRAMUSCULAR; INTRAVENOUS
Status: DISCONTINUED | OUTPATIENT
Start: 2018-09-06 | End: 2018-09-06 | Stop reason: HOSPADM

## 2018-09-06 RX ORDER — DOCUSATE SODIUM 100 MG/1
100 CAPSULE, LIQUID FILLED ORAL 2 TIMES DAILY
Qty: 40 CAP | Refills: 1 | Status: SHIPPED | OUTPATIENT
Start: 2018-09-06 | End: 2018-12-13

## 2018-09-06 RX ORDER — LIDOCAINE HYDROCHLORIDE 10 MG/ML
0.5 INJECTION, SOLUTION EPIDURAL; INFILTRATION; INTRACAUDAL; PERINEURAL AS NEEDED
Status: DISCONTINUED | OUTPATIENT
Start: 2018-09-06 | End: 2018-09-06 | Stop reason: HOSPADM

## 2018-09-06 RX ORDER — GLYCOPYRROLATE 0.2 MG/ML
INJECTION INTRAMUSCULAR; INTRAVENOUS AS NEEDED
Status: DISCONTINUED | OUTPATIENT
Start: 2018-09-06 | End: 2018-09-06 | Stop reason: HOSPADM

## 2018-09-06 RX ORDER — OXYCODONE HYDROCHLORIDE 5 MG/1
5 TABLET ORAL AS NEEDED
Status: DISCONTINUED | OUTPATIENT
Start: 2018-09-06 | End: 2018-09-06 | Stop reason: HOSPADM

## 2018-09-06 RX ORDER — BUPIVACAINE HYDROCHLORIDE 5 MG/ML
INJECTION, SOLUTION EPIDURAL; INTRACAUDAL AS NEEDED
Status: DISCONTINUED | OUTPATIENT
Start: 2018-09-06 | End: 2018-09-06 | Stop reason: HOSPADM

## 2018-09-06 RX ADMIN — CEFOTETAN DISODIUM 2 G: 2 INJECTION, POWDER, FOR SOLUTION INTRAMUSCULAR; INTRAVENOUS at 11:59

## 2018-09-06 RX ADMIN — MIDAZOLAM HYDROCHLORIDE 2 MG: 1 INJECTION, SOLUTION INTRAMUSCULAR; INTRAVENOUS at 11:44

## 2018-09-06 RX ADMIN — GLYCOPYRROLATE 0.1 MG: 0.2 INJECTION INTRAMUSCULAR; INTRAVENOUS at 13:05

## 2018-09-06 RX ADMIN — FENTANYL CITRATE 25 MCG: 50 INJECTION, SOLUTION INTRAMUSCULAR; INTRAVENOUS at 14:03

## 2018-09-06 RX ADMIN — FENTANYL CITRATE 25 MCG: 50 INJECTION, SOLUTION INTRAMUSCULAR; INTRAVENOUS at 13:52

## 2018-09-06 RX ADMIN — ROCURONIUM BROMIDE 5 MG: 10 INJECTION, SOLUTION INTRAVENOUS at 11:53

## 2018-09-06 RX ADMIN — HYDROMORPHONE HYDROCHLORIDE 0.25 MG: 2 INJECTION, SOLUTION INTRAMUSCULAR; INTRAVENOUS; SUBCUTANEOUS at 12:53

## 2018-09-06 RX ADMIN — NEOSTIGMINE METHYLSULFATE 3 MG: 1 INJECTION INTRAVENOUS at 13:14

## 2018-09-06 RX ADMIN — PROPOFOL 50 MG: 10 INJECTION, EMULSION INTRAVENOUS at 11:59

## 2018-09-06 RX ADMIN — FENTANYL CITRATE 50 MCG: 50 INJECTION, SOLUTION INTRAMUSCULAR; INTRAVENOUS at 11:53

## 2018-09-06 RX ADMIN — GLYCOPYRROLATE 0.2 MG: 0.2 INJECTION INTRAMUSCULAR; INTRAVENOUS at 13:14

## 2018-09-06 RX ADMIN — ONDANSETRON 4 MG: 2 INJECTION INTRAMUSCULAR; INTRAVENOUS at 13:14

## 2018-09-06 RX ADMIN — LIDOCAINE HYDROCHLORIDE 60 MG: 20 INJECTION, SOLUTION EPIDURAL; INFILTRATION; INTRACAUDAL; PERINEURAL at 11:53

## 2018-09-06 RX ADMIN — OXYCODONE HYDROCHLORIDE 5 MG: 5 TABLET ORAL at 14:41

## 2018-09-06 RX ADMIN — SUCCINYLCHOLINE CHLORIDE 120 MG: 20 INJECTION INTRAMUSCULAR; INTRAVENOUS at 11:54

## 2018-09-06 RX ADMIN — DEXAMETHASONE SODIUM PHOSPHATE 6 MG: 4 INJECTION, SOLUTION INTRA-ARTICULAR; INTRALESIONAL; INTRAMUSCULAR; INTRAVENOUS; SOFT TISSUE at 12:06

## 2018-09-06 RX ADMIN — FENTANYL CITRATE 25 MCG: 50 INJECTION, SOLUTION INTRAMUSCULAR; INTRAVENOUS at 13:57

## 2018-09-06 RX ADMIN — Medication 80 MCG: at 13:01

## 2018-09-06 RX ADMIN — GLYCOPYRROLATE 0.1 MG: 0.2 INJECTION INTRAMUSCULAR; INTRAVENOUS at 12:18

## 2018-09-06 RX ADMIN — Medication 80 MCG: at 12:10

## 2018-09-06 RX ADMIN — HYDROMORPHONE HYDROCHLORIDE 0.25 MG: 2 INJECTION, SOLUTION INTRAMUSCULAR; INTRAVENOUS; SUBCUTANEOUS at 13:15

## 2018-09-06 RX ADMIN — FENTANYL CITRATE 50 MCG: 50 INJECTION, SOLUTION INTRAMUSCULAR; INTRAVENOUS at 12:20

## 2018-09-06 RX ADMIN — Medication 80 MCG: at 12:38

## 2018-09-06 RX ADMIN — FENTANYL CITRATE 50 MCG: 50 INJECTION, SOLUTION INTRAMUSCULAR; INTRAVENOUS at 13:15

## 2018-09-06 RX ADMIN — PROPOFOL 150 MG: 10 INJECTION, EMULSION INTRAVENOUS at 11:53

## 2018-09-06 RX ADMIN — ONDANSETRON 4 MG: 2 INJECTION INTRAMUSCULAR; INTRAVENOUS at 13:49

## 2018-09-06 RX ADMIN — Medication 2 MG: at 14:21

## 2018-09-06 RX ADMIN — ROCURONIUM BROMIDE 25 MG: 10 INJECTION, SOLUTION INTRAVENOUS at 12:04

## 2018-09-06 RX ADMIN — SODIUM CHLORIDE, SODIUM LACTATE, POTASSIUM CHLORIDE, CALCIUM CHLORIDE: 600; 310; 30; 20 INJECTION, SOLUTION INTRAVENOUS at 11:42

## 2018-09-06 RX ADMIN — EPHEDRINE SULFATE 10 MG: 50 INJECTION, SOLUTION INTRAVENOUS at 13:09

## 2018-09-06 RX ADMIN — ENOXAPARIN SODIUM 40 MG: 40 INJECTION, SOLUTION INTRAVENOUS; SUBCUTANEOUS at 11:36

## 2018-09-06 RX ADMIN — FENTANYL CITRATE 25 MCG: 50 INJECTION, SOLUTION INTRAMUSCULAR; INTRAVENOUS at 14:08

## 2018-09-06 RX ADMIN — Medication 80 MCG: at 12:16

## 2018-09-06 NOTE — OP NOTES
Gynecologic Oncology Operative Report    Vincenzo Curry  9/6/2018    PREOPERATIVE DIAGNOSIS:  1) Complex ovarian mass    POSTOPERATIVE DIAGNOSIS:  1) Right ovarian mucinous cystadenoma    PROCEDURE:  Laparoscopic resection of adnexal mass, right salpingo-oophorectomy    Surgeon:  Nghia Grove MD    Assistant:  Shameka Cadet PA-C      Anesthesia:  General endotracheal anesthesia    EBL:  <10 cc    Preoperative antibiotics: Ancef 2grams    VTE Prophylaxis: SCDs    Complications:  None    Specimens:  Pelvic mass (right tube and ovary), pelvic washings    Operative indications:  52yo with complex pelvic mass, normal Ca-125     Operative findings: On exam under anesthesia, there was a 15-cm mobile mass in the pelvis, smooth walled. Normal appearing cervix, although IUD strings were not visualized. On laparoscopic survey, normal appearing uterus and left tube and ovary. There was a 15-cm oblonged mass arising from the right ovary, with multiple simple appearing cysts. Normal appearing right tube. Normal appearing abdomen, including peritoneum, liver edge, diaphragm, small bowel, omentum, and rectosigmoid colon. Appendix was most likely  retroperitoneal and not visualizedThin physiologic adhesions of the cecum to the right pelvic brim. Frozen pathology of right adnexal mass consistent with benign cystadenoma, favoring mucinous. Appendix was retroperitoneal.       Operative note:  After the risks, benefits, indications, and alternatives of the procedure were discussed with the patient and informed consent was obtained, the patient was taken to the operating room. She was positively identified, administered general anesthesia, and then placed in the dorsal lithotomy position in [address] [place] [address]. An exam under anesthesia was performed with the above findings. She was then prepped and draped in the usual fashion. A mooney catheter was inserted.      An 14PW umbilical incision was then made with #15-blade and carried down to the underlying fascia. The fascia was incised and the peritoneal cavity entered via an open Sam technique. 10-mm balloon trocar was then placed and intra-peritoneal placement confirmed via direct visualization. The abdomen was surveyed with the above findings. The abdomen was then insufflated with CO2 to a pressure of 15mmHg. Bilateral right and left lower quadrant 5-mm trocars were then inserted under direct visualization. The patient was placed in Trendelenburg and pelvic washings were obtained. The above surgical findings were noted. Attention was then turned to the pelvis. The right pelvic side-wall was entered and the ureter identified. The right infundibulopelvic ligament was skeletonized, then sealed and divided with the LigaSure device. Dissection was carried along the mesosalpinx to the level of the uterine fundus. The tube and utero-ovarian ligament was then sealed and divided with the LigaSure device at the level of the uterine fundus. The specimen was then placed in a 15-mm EndoCatch bag via the umbilical incision and removed via the umbilical incision, completely contained within the EndoCatch bag without spillage. Frozen pathology was consistent with a benign cystadenoma. The intra-abdominal pressure was then decreased and all planes of dissection were hemostatic. The lateral trocars were removed. The insufflation was released prior to removal of the umbilical port-site. The umbilical fascia was reapproximated with a figure-of-8 stitch using 0-Vicryl on a UR-6 needle. All skin incisions were closed 4-0 monocryl subcuticular stitch. Skin glue was applied to all skin incisions. The patient was taken out of stirrups, awakened from anesthesia, and taken to the recovery room in stable condition. The patient tolerated the procedure well. All instrument, sponge, and needle counts were correct.         Mary Lou Grullon MD  9/6/2018  2:09 PM

## 2018-09-06 NOTE — BRIEF OP NOTE
BRIEF OPERATIVE NOTE Date of Procedure: 9/6/2018 Preoperative Diagnosis: COMPLEX RIGHT OVARIAN MASS Postoperative Diagnosis: COMPLEX RIGHT OVARIAN MASS Procedure(s): LAPAROSCOPIC RIGHT  SALPINGO OOPHRECTOMY RESECTION OF OVARIAN MASS Surgeon(s) and Role: Marco Saucedo MD - Primary Surgical Assistant: Serena Mathis PA-C Surgical Staff: 
Circ-1: Baldemar Moritz, RN 
Circ-Intern: Mika Harrell RN Physician Assistant: TYLER Flores Scrub RN-1: Rosalene Bloch, RN Surg Asst-1: Reidcharisma Petit Event Time In Incision Start 21  Incision Close 1318 Anesthesia: General  
Estimated Blood Loss: minimal 
Specimens:  
ID Type Source Tests Collected by Time Destination 1 : RIGHT OVARIAN MASS Frozen Section Ovary  Haresh Mahmood MD 9/6/2018 1232 Pathology 1 : PELVIC WASHINGS Fresh Pelvis  Haresh Mahmood MD 9/6/2018 1234 Cytology Findings: On exam under anesthesia, there was a 15-cm mobile mass in the pelvis, smooth walled. Normal appearing cervix, although IUD strings were not visualized. On laparoscopic survey, normal appearing uterus and left tube and ovary. There was a 15-cm oblonged mass arising from the right ovary, with multiple simple appearing cysts. Normal appearing right tube. Normal appearing abdomen, including peritoneum, liver edge, diaphragm, small bowel, omentum, and rectosigmoid colon. Appendix was most likely  retroperitoneal and not visualizedThin physiologic adhesions of the cecum to the right pelvic brim. Frozen pathology of right adnexal mass consistent with benign cystadenoma, favoring mucinous. Appendix was retroperitoneal.   
Complications: none Implants: * No implants in log * Haresh Mahmood MD

## 2018-09-06 NOTE — H&P (VIEW-ONLY)
[address] [facility], [address] 
P [phone]  F [phone] Office Note Patient ID: 
Name:  Phong Puente MRN:  [id number] :  1965/53 y.o. Date:  2018 HISTORY OF PRESENT ILLNESS: 
Phong Puente is a 48 y.o.  perimenopausal female who presents in consultation for enlarging pelvic mass. Pelvic ultrasound on 18 demonstrated a 16.3 x 7.8 x 10.0cm right ovarian mass with \"multiple septations\". The patient originally presented to Dr. Leslie Mcknight reporting increasing RLQ pain and bloating on 18. Patient reports she had an episode of pelvic pain 2 weeks ago while she was on vacation, that was somewhat relieved by urination. However, she felt like something was just not right, and decided she needed to see her Gynecologist. She reports some weight gain over the last 3 years. Denies any vaginal bleeding for a number of years. Denies change in appetite, satiety, or bowel habits. Denies hematuria or hematochezia. Denies CP or SOB. Pertinent PMH/PSH: hypertension, history of spinal fusion Active, no restrictions. ROS: 
A comprehensive review of systems was negative except for that written in the History of Present Illness. , 10 point ROS 
 
OB/GYN ROS: 
 Patient denies significant menstrual problems. Patient denies any abnormal bleeding or vaginal discharge. ECOG ndGndrndanddndend:nd nd2nd Problem List: 
Patient Active Problem List  
 Diagnosis Date Noted  Ovarian mass, right 2018  History of abnormal cervical Pap smear 2018  Scoliosis  Chronic pain  Arthritis PMH: 
Past Medical History:  
Diagnosis Date  Arthritis  Chronic pain  Nausea & vomiting  Nerve compression   
 neck and left arm  Ovarian cyst   
 Pap smear abnormality of cervix 2008  
 + HPV, laser treatment for cervix in , colpo  negative  Pap smear abnormality of cervix 2009 +HPV  Pap smear abnormality of cervix 09/13/2010  
 +HPV (16,18 negative)  Pap smear abnormality of cervix 01/2011 ASCUS, HPV +, repap in 8/11 and 10/13 - WNL  Psychiatric disorder Anxiety  Scoliosis PSH: 
Past Surgical History:  
Procedure Laterality Date [address] IMPLANTS  
 CKC, AKA COLD KNIFE CONE  1992  HX GYN  2008  
 laser treatment of cervix  HX OTHER SURGICAL  2012  
 spinal fusion  HX WISDOM TEETH EXTRACTION    
 IMPLANT BREAST SILICONE/EQ Bilateral   
 1997 Social History: 
Social History Substance Use Topics  Smoking status: Former Smoker  Smokeless tobacco: Never Used  Alcohol use 1.8 oz/week 3 Standard drinks or equivalent per week Comment: OCCASIONALLY Family History: 
Family History Problem Relation Age of Onset  Cancer Mother Blossom Lechuga Mother 64  Cancer Father  Seizures Sister  Breast Cancer Maternal Grandmother 80's  Breast Cancer Maternal Aunt 60's Medications: (reviewed) Current Outpatient Prescriptions Medication Sig  ibuprofen (ADVIL PO) Take  by mouth. No current facility-administered medications for this visit. Allergies: (reviewed) No Known Allergies Gyn History:  
Last pap: normal 11/2015 History of abnormal pap: yes, last abnormal in 1/2011 (ASCUS, HPV +); f/u 8/2011 and 10/2013 normal 
History of STI: yes, HPV Menses: IUD in place Contraception: Mirena IUD (1/28/11 - keeping in until menopause) OBJECTIVE: 
 
Physical Exam: VITAL SIGNS: Vitals:  
 08/27/18 1047 08/27/18 1052 BP: (!) 178/100 (!) 167/102 Pulse: 68 66 Weight: 158 lb 9.6 oz (71.9 kg) Height: 5' 7\" (1.702 m) Body mass index is 24.84 kg/(m^2). GENERAL AYAD: Conversant, alert, oriented. No acute distress. HEENT: HEENT. No thyroid enlargement. No JVD. Neck: Supple without restrictions. RESPIRATORY: Clear to auscultation and percussion to the bases. No CVAT. CARDIOVASC: RRR without murmur/rub. GASTROINT: soft, nondistended, no masses palpated, without organomegaly, slight tenderness; patient self guarding prior to even touching her abdomen MUSCULOSKEL: no joint tenderness, deformity or swelling EXTREMITIES: extremities normal, atraumatic, no cyanosis or edema PELVIC: Vulva and vagina appear normal. Normal appearing cervix with IUD strings visualized. Bimanual exam reveals normal uterus. Patient largely intolerant of bimanual exam, thus could not palpate mass. RECTAL: deferred VICTORIA SURVEY: No suspicious lymphadenopathy or edema noted. NEURO: Grossly intact. No acute deficit. Lab Date as available: 
Ca-125 (8/23/18) = 12.6 Imaging: 
Pelvic ultrasound 8/23/18: Impression: normal sized post emnopausal uterus with correctly positioned IUD. Large ~14cm complex right adnexa mass with multiple septations, no free fluid or ascites noted, left adnexae appears normal. 
 
IMPRESSION/PLAN: 
 
Darin Kam is a 48 y.o. female with a working diagnosis of complex right ovarian mass; normal Ca-125 (12.6) Problems: 
  
Patient Active Problem List  
 Diagnosis Date Noted  Ovarian mass, right 08/27/2018  History of abnormal cervical Pap smear 08/27/2018  Scoliosis  Chronic pain  Arthritis I reviewed Ms. Darin Kam course to date, including her medical records, recent studies, physical exam, and review of symptoms. Given complex appearance and size, recommend surgical removal. Discussed laparoscopic versus open surgery. Will plan for CT A/P for further evaluation and to help aid in decision regarding laparoscopic versus open surgery. If appears amenable to laparoscopy, will plan for diagnostic laparoscopy with USO versus BSO with frozen pathology.  If frozen pathology consistent with borderline or malignancy, will plan on staging as indicated including TLH/BSO, possible pelvic/para-aortic LND, possible omentectomy, possible debulking as indicated. If benign, reviewed the role of contralateral ovarian preservation. Reviewed cardiovascular, bone, and colon health with ovarian preservation. However, given the patient's age (48 years old), this added benefit may be low. The patient is not at increased risk of endometrial pathology in the future compared to the general population, although I discussed the possibility of a hysterectomy at the time of surgery even if the mass is benign. At this time the patient is unsure about proceeding with a hysterectomy and BSO at the time of removing the right ovarian mass. It is reasonable to perform a TLH/BSO even if the mass is benign given her age and history of abnormal pap smears. Will readdress again on the day of surgery. Will collect CEA today as well, along with CBCD, CMP, EKG, CXR, and CT A/P. If CEA is elevated, will consider colonoscopy prior to surgery; otherwise, recommend colonscopy after recovery. Posted for diagnostic laparoscopy, USO, etc on 9/6/18. All questions and concerns were addressed with the patient and she is comfortable with the plan.  
 
 
Defined Sensitive Document 
 
>50% of total time allocated to visit dedicated to counseling, 60 minutes total. 
 
Signed By: Argenis John MD   
 8/27/2018/10:47 AM

## 2018-09-06 NOTE — ANESTHESIA POSTPROCEDURE EVALUATION
Post-Anesthesia Evaluation and Assessment Patient: Nidhi Rodarte MRN: [id number]  SSN: [id number] YOB: 1965  Age: 48 y.o. Sex: female Cardiovascular Function/Vital Signs Visit Vitals  /65  Pulse 69  Temp 36.2 °C (97.1 °F)  Resp 11  
 Ht 5' 6.5\" (1.689 m)  Wt 70.8 kg (156 lb)  SpO2 95%  BMI 24.8 kg/m2 Patient is status post general anesthesia for Procedure(s): LAPAROSCOPIC RIGHT  SALPINGO OOPHRECTOMY RESECTION OF OVARIAN MASS. Nausea/Vomiting: None Postoperative hydration reviewed and adequate. Pain: 
Pain Scale 1: Numeric (0 - 10) (09/06/18 1421) Pain Intensity 1: 5 (09/06/18 1421) Managed Neurological Status:  
Neuro (WDL): Within Defined Limits (09/06/18 1413) At baseline Mental Status and Level of Consciousness: Arousable Pulmonary Status:  
O2 Device: Room air (09/06/18 1413) Adequate oxygenation and airway patent Complications related to anesthesia: None Post-anesthesia assessment completed. No concerns Signed By: Lena Jones MD   
 September 6, 2018

## 2018-09-06 NOTE — IP AVS SNAPSHOT
2700 [facility] [address] [place] [address] 
[phone] Patient: Demond Tovar MRN: [id number] :1965 About your hospitalization You were admitted on:  2018 You last received care in the:  [facility] PACU You were discharged on:  2018 Why you were hospitalized Your primary diagnosis was:  Not on File Your diagnoses also included:  Cystadenoma Of Right Ovary Follow-up Information Follow up With Details Comments Contact Info Isha Chong MD   [address] [place] [address] 
[phone] Maximo Garcia MD Schedule an appointment as soon as possible for a visit today 4-6 weeks for followup [address] 
[phone] Discharge Orders None A check davon indicates which time of day the medication should be taken. My Medications START taking these medications Instructions Each Dose to Equal  
 Morning Noon Evening Bedtime  
 acetaminophen 325 mg tablet Commonly known as:  TYLENOL Your last dose was: Your next dose is: Take 2 Tabs by mouth every four (4) hours as needed for Pain. 650 mg  
    
   
   
   
  
 docusate sodium 100 mg capsule Commonly known as:  Laurens Peels Your last dose was: Your next dose is: Take 1 Cap by mouth two (2) times a day. Hold for loose stools. 100 mg  
    
   
   
   
  
 oxyCODONE 5 mg capsule Commonly known as:  OXYIR Your last dose was: Your next dose is: Take 1 Cap by mouth every four (4) hours as needed. Max Daily Amount: 30 mg.  
 5 mg CHANGE how you take these medications Instructions Each Dose to Equal  
 Morning Noon Evening Bedtime  
 ibuprofen 600 mg tablet Commonly known as:  MOTRIN What changed:   
- medication strength 
- how much to take - when to take this - reasons to take this Your last dose was: Your next dose is: Take 1 Tab by mouth every eight (8) hours for 4 days. 600 mg Where to Get Your Medications Information on where to get these meds will be given to you by the nurse or doctor. ! Ask your nurse or doctor about these medications  
  acetaminophen 325 mg tablet  
 docusate sodium 100 mg capsule  
 ibuprofen 600 mg tablet  
 oxyCODONE 5 mg capsule Opioid Education Prescription Opioids: What You Need to Know: 
 
Prescription opioids can be used to help relieve moderate-to-severe pain and are often prescribed following a surgery or injury, or for certain health conditions. These medications can be an important part of treatment but also come with serious risks. Opioids are strong pain medicines. Examples include hydrocodone, oxycodone, fentanyl, and morphine. Heroin is an example of an illegal opioid. It is important to work with your health care provider to make sure you are getting the safest, most effective care. WHAT ARE THE RISKS AND SIDE EFFECTS OF OPIOID USE? Prescription opioids carry serious risks of addiction and overdose, especially with prolonged use. An opioid overdose, often marked by slow breathing, can cause sudden death. The use of prescription opioids can have a number of side effects as well, even when taken as directed. · Tolerance-meaning you might need to take more of a medication for the same pain relief · Physical dependence-meaning you have symptoms of withdrawal when the medication is stopped. Withdrawal symptoms can include nausea, sweating, chills, diarrhea, stomach cramps, and muscle aches. Withdrawal can last up to several weeks, depending on which drug you took and how long you took it. · Increased sensitivity to pain · Constipation · Nausea, vomiting, and dry mouth · Sleepiness and dizziness · Confusion · Depression · Low levels of testosterone that can result in lower sex drive, energy, and strength · Itching and sweating RISKS ARE GREATER WITH:      
· History of drug misuse, substance use disorder, or overdose · Mental health conditions (such as depression or anxiety) · Sleep apnea · Older age (72 years or older) · Pregnancy Avoid alcohol while taking prescription opioids. Also, unless specifically advised by your health care provider, medications to avoid include: · Benzodiazepines (such as Xanax or Valium) · Muscle relaxants (such as Soma or Flexeril) · Hypnotics (such as Ambien or Lunesta) · Other prescription opioids KNOW YOUR OPTIONS Talk to your health care provider about ways to manage your pain that don't involve prescription opioids. Some of these options may actually work better and have fewer risks and side effects. Options may include: 
· Pain relievers such as acetaminophen, ibuprofen, and naproxen · Some medications that are also used for depression or seizures · Physical therapy and exercise · Counseling to help patients learn how to cope better with triggers of pain and stress. · Application of heat or cold compress · Massage therapy · Relaxation techniques Be Informed Make sure you know the name of your medication, how much and how often to take it, and its potential risks & side effects. IF YOU ARE PRESCRIBED OPIOIDS FOR PAIN: 
· Never take opioids in greater amounts or more often than prescribed. Remember the goal is not to be pain-free but to manage your pain at a tolerable level. · Follow up with your primary care provider to: · Work together to create a plan on how to manage your pain. · Talk about ways to help manage your pain that don't involve prescription opioids. · Talk about any and all concerns and side effects. · Help prevent misuse and abuse. · Never sell or share prescription opioids · Help prevent misuse and abuse. · Store prescription opioids in a secure place and out of reach of others (this may include visitors, children, friends, and family). · Safely dispose of unused/unwanted prescription opioids: Find your community drug take-back program or your pharmacy mail-back program, or flush them down the toilet, following guidance from the Food and Drug Administration (www.fda.gov/Drugs/ResourcesForYou). · Visit www.cdc.gov/drugoverdose to learn about the risks of opioid abuse and overdose. · If you believe you may be struggling with addiction, tell your health care provider and ask for guidance or call [facility] at 8-288-310-LJXR. Discharge Instructions [address], [address] [place] 
P [phone]  F [phone] YOUR DISCHARGE INSTRUCTIONS Yahairaetta Last, Please review your instructions with your nurse and ask any questions so you have all the information you need to recover well at home. If you do not feel you have everything you need to succeed and be safe after you leave the hospital, please discuss these concerns with your nurse. As always, call for any questions at home. Your doctor: Dr. Lawson Favorite Diagnosis: COMPLEX RIGHT OVARIAN MASS Procedure: Procedure(s): LAPAROSCOPIC RIGHT  SALPINGO OOPHRECTOMY RESECTION OF OVARIAN MASS Date of Discharge: 9/6/2018 Take Home Medications See Discharge Medication Review provided to you by your nurse. If you did not receive one, request this prior to your discharge. · It is important that you take your medications as they are prescribed. · Keep your medications in the bottles provided by the pharmacist and keep a list of the medication names, dosages, times to be taken and what they are for in your wallet. · Do not take other medications without consulting your doctor. · If you no longer need your prescribed pain medication, you may take Tylenol or Aleve alone. · You should take a daily gentle stool softener such as a colace pill or dulcolax suppository for constipation as this is not uncommon after surgery and/or while on pain medication. If not prescribed, this can be found over the counter. If constipation persists for >24 hours you should take a dose of Milk of Magnesia. Call if your constipation continues. Diet · Stay hydrated and drink fluids such as gatorade and water. This will also help prevent constipation and dehydration. Limit somewhat any usual caffeine intake of beverages such as soda, tea and coffee as this may serve to dehydrate you. · For the first 2-3 days keep a low fiber diet avoiding raw vegetables or fruits with skin. A diet consisting of soup, cereal, yogurt, eggs, fish, Boost/Ensure. Avoid fatty/greasy foods. · If nauseated, keep your diet limited to liquids and call if this persists. Activity · If possible, have someone with you at all times until you feel stable. · Gradually increase your activity each day. There are generally no restrictions on walking, climbing stairs or riding in a car. Try to walk at least 4 times per day. · Showers are okay. If you have an incision, no tub bathing/swimming for two weeks. · No driving for 1 week and/or while on pain medication. · There are no lifting restrictions if you did not have surgery. · If you had surgery, the following restrictions are in place: 1. No lifting greater than 15 lbs for 4 weeks. Incision · You should expect some discomfort in the area of your incision, particularly as you increase your activity. If you notice an area of increasing redness or new drainage, please call your doctor. · Staples are generally removed in 10-14 days.   
· Many incisions will have buried absorbable sutures, which do not need to be removed and are covered by protective glue. This will come off over time. Causes For Concern If any of the following occur, please call our office and speak with the Nurse/aid who will help you with your problem or ask the doctor to call you. Problems with the incision, including increasing pain, swelling, redness or drainage. Increasing abdominal pain despite medication Persisting nausea or vomiting. Fever or chills and a temperature >101F Constipation (no bowel movement for three days). Diarrhea (more than three watery stools within 24 hours). Excessive vaginal or wound bleeding. If after hours and you cannot reach an on-call physician, call 911. Follow-Up Call [phone] to schedule an appointment with Dr. Martha Santa 
 in 4-6 weeks. Information obtained by : 
I understand that if any problems occur once I am at home I am to contact my physician. I understand and acknowledge receipt of the instructions indicated above. Physician's or R.N.'s Signature                                                                  Date/Time Patient or Representative Signature                                                          Date/Time ROXICODONE 5MG GIVEN AT 2:40 PM 9-6-18 DISCHARGE SUMMARY from Nurse PATIENT INSTRUCTIONS: 
 
After general anesthesia or intravenous sedation, for 24 hours or while taking prescription Narcotics: · Limit your activities · Do not drive and operate hazardous machinery · Do not make important personal or business decisions · Do  not drink alcoholic beverages · If you have not urinated within 8 hours after discharge, please contact your surgeon on call. Report the following to your surgeon: 
· Excessive pain, swelling, redness or odor of or around the surgical area · Temperature over 100.5 · Nausea and vomiting lasting longer than 4 hours or if unable to take medications · Any signs of decreased circulation or nerve impairment to extremity: change in color, persistent  numbness, tingling, coldness or increase pain · Any questions The discharge information has been reviewed with the {PATIENT PARENT GUARDIAN:60448}. The {PATIENT PARENT GUARDIAN:31615} verbalized understanding. Discharge medications reviewed with the {Dishcarge meds reviewed KYXV:37612} and appropriate educational materials and side effects teaching were provided. Introducing [facility]! [facility] introduces [employer] patient portal. Now you can access parts of your medical record, email your doctor's office, and request medication refills online. 1. In your internet browser, go to [email or web address] 2. Click on the First Time User? Click Here link in the Sign In box. You will see the New Member Sign Up page. 3. Enter your [employer] Access Code exactly as it appears below. You will not need to use this code after youve completed the sign-up process. If you do not sign up before the expiration date, you must request a new code. · [employer] Access Code: [email or web address] Expires: 11/25/2018 10:47 AM 
 
4. Enter the last four digits of your Social Security Number (xxxx) and Date of Birth (mm/dd/yyyy) as indicated and click Submit. You will be taken to the next sign-up page. 5. Create a Birdhouse for Autismt ID. This will be your [employer] login ID and cannot be changed, so think of one that is secure and easy to remember. 6. Create a [employer] password. You can change your password at any time. 7. Enter your Password Reset Question and Answer. This can be used at a later time if you forget your password. 8. Enter your e-mail address. You will receive e-mail notification when new information is available in [address] 9. Click Sign Up. You can now view and download portions of your medical record. 10. Click the Download Summary menu link to download a portable copy of your medical information. If you have questions, please visit the Frequently Asked Questions section of the Screen [employer] website. Remember, CourseWeaver is NOT to be used for urgent needs. For medical emergencies, dial 911. Now available from your iPhone and Android! Introducing Edson Alvarez As a Ariane Maryjane patient, I wanted to make you aware of our electronic visit tool called Edson Alvarez. [facility] 24/7 allows you to connect within minutes with a medical provider 24 hours a day, seven days a week via a mobile device or tablet or logging into a secure website from your computer. You can access Edson Alvarez from anywhere in the [place]. A virtual visit might be right for you when you have a simple condition and feel like you just dont want to get out of bed, or cant get away from work for an appointment, when your regular Ariane Maryjane provider is not available (evenings, weekends or holidays), or when youre out of town and need minor care. Electronic visits cost only $49 and if the Ariane Maryjane 24/7 provider determines a prescription is needed to treat your condition, one can be electronically transmitted to a nearby pharmacy*. Please take a moment to enroll today if you have not already done so. The enrollment process is free and takes just a few minutes. To enroll, please download the Ariane Wickr 24/7 edson to your tablet or phone, or visit [email or web address] to enroll on your computer.    
And, as an [address] [place] [address] patient with a Amnis account, the results of your visits will be scanned into your electronic medical record and your primary care provider will be able to view the scanned results. We urge you to continue to see your regular Reema Eris provider for your ongoing medical care. And while your primary care provider may not be the one available when you seek a Inofilekeyonnafin virtual visit, the peace of mind you get from getting a real diagnosis real time can be priceless. For more information on Raidarrr, view our Frequently Asked Questions (FAQs) at [email or web address]. Sincerely, 
 
Paul Gant MD 
Chief Medical Officer [address] *:  certain medications cannot be prescribed via Raidarrr Providers Seen During Your Hospitalization Provider Specialty Primary office phone Merry Amador MD Obstetrics & Gynecology [phone] Your Primary Care Physician (PCP) Primary Care Physician Office Phone Office Fax Pablitoajith Kojo [phone] You are allergic to the following No active allergies Recent Documentation Height Weight BMI OB Status Smoking Status 1.689 m 70.8 kg 24.8 kg/m2 IUD Former Smoker Emergency Contacts Name Discharge Info Relation Home Work Mobile [facility] DISCHARGE CAREGIVER [3] Parent [1] [phone] Patient Belongings The following personal items are in your possession at time of discharge: 
  Dental Appliances: None  Visual Aid: Glasses             Clothing: Other (comment) (clothing bag to pacu) Discharge Instructions Attachments/References MEFS - OXYCODONE, RAPID RELEASE (ETH-OXYDOSE, OXY IR, ROXICODONE) - (BY MOUTH) (ENGLISH) MEFS - LAXATIVE, STOOL SOFTENERS (DOCULAX, COLACE, COLACE CLEAR, DSS) - (BY MOUTH) (ENGLISH) MEFS - IBUPROFEN (ADVIL, ADVIL CHILDREN'S, MOTRIN, CHILDREN'S IBUPROFEN) - (BY MOUTH) (ENGLISH) MEFS - ACETAMINOPHEN (ACETAMINOPHEN CHILDREN'S, ACETAMINOPHEN INFANT'S, CHILDREN'S ACETAMINOPHEN, 8 HOUR PAIN RELIEF) - (BY MOUTH) (ENGLISH) Patient Handouts Oxycodone, Rapid Release (ETH-Oxydose, Oxy IR, Roxicodone) - (By mouth) Why this medicine is used:  
Treats moderate to severe pain. This medicine is a narcotic pain reliever. Contact a nurse or doctor right away if you have: 
· Fast or slow heart beat, shallow breathing, blue lips, skin or fingernails · Anxiety, restlessness, fever, sweating, muscle spasms, twitching, seeing or hearing things that are not there · Extreme weakness, shallow breathing, slow heartbeat · Severe confusion, lightheadedness, dizziness, fainting · Sweating or cold, clammy skin, seizures · Severe constipation, stomach pain, nausea, vomiting Common side effects: · Mild constipation · Sleepiness, tiredness © 2017 [facility] Information is for End User's use only and may not be sold, redistributed or otherwise used for commercial purposes. Laxative, Stool Softeners (Doculax, Colace, Colace Clear, DSS) - (By mouth) Why this medicine is used:  
Treats constipation by helping you have a bowel movement. Contact a nurse or doctor right away if you have: · Dark urine or pale stools · Vomiting, loss of appetite, stomach pain · Yellow skin or eyes Common side effects: 
· Nausea, diarrhea, stomach cramps, bitter taste in mouth © 2017 [facility] Information is for End User's use only and may not be sold, redistributed or otherwise used for commercial purposes. Ibuprofen (Advil, Advil Children's, Motrin, Children's Ibuprofen) - (By mouth) Why this medicine is used:  
Treats pain and fever. This medicine is an NSAID. Contact a nurse or doctor right away if you have: 
· Change in how much or how often you urinate · Severe stomach pain, vomiting blood, bloody or black tarry stools · Swelling in your hands, ankles, or feet; rapid weight gain Common side effects: 
· Constipation, diarrhea, gas, mild upset stomach · Ringing in your ears, dizziness, headache © 2017 [facility] Information is for End User's use only and may not be sold, redistributed or otherwise used for commercial purposes. Acetaminophen (Acetaminophen Children's, Acetaminophen Infant's, Children's Acetaminophen, 8 Hour Pain Relief) - (By mouth) Why this medicine is used:  
Treats minor aches and pain and reduces fever. Contact a nurse or doctor right away if you have: · Dark urine or pale stools · Yellow skin or eyes · Loss of appetite, severe stomach pain · Lightheadedness, fainting, sweating, weakness Common side effects: 
· Nausea, vomiting, constipation © 2017 [facility] Information is for End User's use only and may not be sold, redistributed or otherwise used for commercial purposes. Please provide this summary of care documentation to your next provider. Signatures-by signing, you are acknowledging that this After Visit Summary has been reviewed with you and you have received a copy. Patient Signature:  ____________________________________________________________ Date:  ____________________________________________________________  
  
Nisreen Hwang Provider Signature:  ____________________________________________________________ Date:  ____________________________________________________________

## 2018-09-06 NOTE — DISCHARGE INSTRUCTIONS
[facility] GYNECOLOGIC ONCOLOGY  [address] [facility], [address]  P [phone]  F [phone]     Johana Rodarte,      Please review your instructions with your nurse and ask any questions so you have all the information you need to recover well at home. If you do not feel you have everything you need to succeed and be safe after you leave the hospital, please discuss these concerns with your nurse. As always, call for any questions at home. Your doctor: Dr. Bernice Rosado  Diagnosis: COMPLEX RIGHT OVARIAN MASS  Procedure: Procedure(s):  LAPAROSCOPIC RIGHT  SALPINGO OOPHRECTOMY RESECTION OF OVARIAN MASS  Date of Discharge: 9/6/2018      Take Home Medications     See Discharge Medication Review provided to you by your nurse. If you did not receive one, request this prior to your discharge. · It is important that you take your medications as they are prescribed. · Keep your medications in the bottles provided by the pharmacist and keep a list of the medication names, dosages, times to be taken and what they are for in your wallet. · Do not take other medications without consulting your doctor. · If you no longer need your prescribed pain medication, you may take Tylenol or Aleve alone. · You should take a daily gentle stool softener such as a colace pill or dulcolax suppository for constipation as this is not uncommon after surgery and/or while on pain medication. If not prescribed, this can be found over the counter. If constipation persists for >24 hours you should take a dose of Milk of Magnesia. Call if your constipation continues. Diet    · Stay hydrated and drink fluids such as gatorade and water. This will also help prevent constipation and dehydration. Limit somewhat any usual caffeine intake of beverages such as soda, tea and coffee as this may serve to dehydrate you.   · For the first 2-3 days keep a low fiber diet avoiding raw vegetables or fruits with skin. A diet consisting of soup, cereal, yogurt, eggs, fish, Boost/Ensure. Avoid fatty/greasy foods. · If nauseated, keep your diet limited to liquids and call if this persists. Activity    · If possible, have someone with you at all times until you feel stable. · Gradually increase your activity each day. There are generally no restrictions on walking, climbing stairs or riding in a car. Try to walk at least 4 times per day. · Showers are okay. If you have an incision, no tub bathing/swimming for two weeks. · No driving for 1 week and/or while on pain medication. · There are no lifting restrictions if you did not have surgery. · If you had surgery, the following restrictions are in place:  1. No lifting greater than 15 lbs for 4 weeks. Incision    · You should expect some discomfort in the area of your incision, particularly as you increase your activity. If you notice an area of increasing redness or new drainage, please call your doctor. · Staples are generally removed in 10-14 days. · Many incisions will have buried absorbable sutures, which do not need to be removed and are covered by protective glue. This will come off over time. Causes For Concern    If any of the following occur, please call our office and speak with the Nurse/aid who will help you with your problem or ask the doctor to call you. Problems with the incision, including increasing pain, swelling, redness or drainage. Increasing abdominal pain despite medication  Persisting nausea or vomiting. Fever or chills and a temperature >101F  Constipation (no bowel movement for three days). Diarrhea (more than three watery stools within 24 hours). Excessive vaginal or wound bleeding. If after hours and you cannot reach an on-call physician, call 221. Follow-Up    Call [phone] to schedule an appointment with Dr. Cardenas Both   in 4-6 weeks. Information obtained by :   I understand that if any problems occur once I am at home I am to contact my physician. I understand and acknowledge receipt of the instructions indicated above. Physician's or R.N.'s Signature                                                                  Date/Time                                                                                                                                              Patient or Representative Signature                                                          Date/Time      ROXICODONE 5MG GIVEN AT 2:40 PM 18        DISCHARGE SUMMARY from Nurse    PATIENT INSTRUCTIONS:    After general anesthesia or intravenous sedation, for 24 hours or while taking prescription Narcotics:  · Limit your activities  · Do not drive and operate hazardous machinery  · Do not make important personal or business decisions  · Do  not drink alcoholic beverages  · If you have not urinated within 8 hours after discharge, please contact your surgeon on call. Report the following to your surgeon:  · Excessive pain, swelling, redness or odor of or around the surgical area  · Temperature over 100.5  · Nausea and vomiting lasting longer than 4 hours or if unable to take medications  · Any signs of decreased circulation or nerve impairment to extremity: change in color, persistent  numbness, tingling, coldness or increase pain  · Any questions    The discharge information has been reviewed with the {PATIENT PARENT GUARDIAN:[id number]}. The {PATIENT PARENT GUARDIAN:68314} verbalized understanding. Discharge medications reviewed with the {Dishcarge meds reviewed CJJ} and appropriate educational materials and side effects teaching were provided.

## 2018-09-06 NOTE — ANESTHESIA PREPROCEDURE EVALUATION
Anesthetic History No history of anesthetic complications Review of Systems / Medical History Patient summary reviewed, nursing notes reviewed and pertinent labs reviewed Pulmonary Within defined limits Neuro/Psych Within defined limits Cardiovascular Within defined limits GI/Hepatic/Renal 
Within defined limits Endo/Other Within defined limits Arthritis Other Findings Physical Exam 
 
Airway Mallampati: II 
TM Distance: > 6 cm Neck ROM: normal range of motion Mouth opening: Normal 
 
 Cardiovascular Regular rate and rhythm,  S1 and S2 normal,  no murmur, click, rub, or gallop Dental 
No notable dental hx Pulmonary Breath sounds clear to auscultation Abdominal 
GI exam deferred Other Findings Anesthetic Plan ASA: 1 Anesthesia type: general 
 
 
 
 
Induction: Intravenous Anesthetic plan and risks discussed with: Patient

## 2018-09-06 NOTE — INTERVAL H&P NOTE
H&P Update: 
Lanice Rubinstein was seen and examined. History and physical has been reviewed. The patient has been examined. There have been no significant clinical changes since the completion of the originally dated History and Physical. Plan for l/s removal of ovarian mass. If malignant or borderline, will move forward with staging. Otherwise, patient does not want a hysterectomy and would like to preserve her other ovary, which is appropriate based on ACOG guidelines and ovarian preservation.   
 
Signed By: Hussein Rushing MD   
 September 6, 2018 11:20 AM

## 2018-09-11 ENCOUNTER — TELEPHONE (OUTPATIENT)
Dept: GYNECOLOGY | Age: 53
End: 2018-09-11

## 2018-09-11 NOTE — TELEPHONE ENCOUNTER
I called Ms. Edward Pierre today regarding her pathology, which was consistent with a benign mucinous cystadenoma. She did not answer her phone and her mailbox is full. I will attempt to call her again tomorrow.      Bria Aragon MD

## 2018-09-13 DIAGNOSIS — G89.18 POSTOPERATIVE PAIN: Primary | ICD-10-CM

## 2018-09-13 RX ORDER — IBUPROFEN 600 MG/1
600 TABLET ORAL
Qty: 30 TAB | Refills: 0 | Status: SHIPPED | OUTPATIENT
Start: 2018-09-13 | End: 2018-09-13 | Stop reason: SDUPTHER

## 2018-09-20 ENCOUNTER — TELEPHONE (OUTPATIENT)
Dept: GYNECOLOGY | Age: 53
End: 2018-09-20

## 2018-10-04 ENCOUNTER — OFFICE VISIT (OUTPATIENT)
Dept: GYNECOLOGY | Age: 53
End: 2018-10-04

## 2018-10-04 VITALS
WEIGHT: 154 LBS | BODY MASS INDEX: 24.17 KG/M2 | HEIGHT: 67 IN | HEART RATE: 66 BPM | DIASTOLIC BLOOD PRESSURE: 81 MMHG | SYSTOLIC BLOOD PRESSURE: 118 MMHG

## 2018-10-04 DIAGNOSIS — D27.0 CYSTADENOMA OF RIGHT OVARY: Primary | ICD-10-CM

## 2018-10-04 NOTE — PROGRESS NOTES
[facility]EC[facility] ONCOLOGY  [address] [facility], [address], [address]  P [phone]  F [phone]    Post-operative Visit  Patient ID:  Name:  Bev Vasquez  MRN:  [id number]  :  1965/53 y.o. Date:  10/4/2018      HISTORY OF PRESENT ILLNESS:  Bev Vasquez is a 48 y.o. female who on 18 underwent Laparoscopic resection of adnexal mass, right salpingo-oophorectomy. 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 Prior to surgery the patient had declined hysterectomy or other procedures if there was no evidence of malignancy. Normal appearing adnexa and uterus otherwise at the time of surgery. She is doing well post-operatively without complaints. Denies vaginal bleeding/discharge, fevers, chills, CP, SOB, or urinary symptoms. Pertinent PMH/PSH: hypertension, history of spinal fusion      Active, no restrictions. Pathology Review  18  FINAL PATHOLOGIC DIAGNOSIS   Fallopian tube, ovary, right, salpingo-oophorectomy:   Fallopian tube: No histopathologic abnormality. Ovary: Benign mucinous cystadenoma. ROS:  A comprehensive review of systems was negative except for that written in the History of Present Illness.  , 10 point ROS      ECOG ndGndrndanddndend:nd nd2nd Problem List:  Patient Active Problem List    Diagnosis Date Noted    Nerve compression     Cystadenoma of right ovary 2018    Ovarian mass, right 2018    History of abnormal cervical Pap smear 2018    Scoliosis     Chronic pain     Arthritis      PMH:  Past Medical History:   Diagnosis Date    Arthritis     SPINE    Chronic pain     PELVIC    Nausea & vomiting     Nerve compression     neck and left arm    Ovarian cyst     Pap smear abnormality of cervix 2008    + HPV, laser treatment for cervix in , colpo  negative    Pap smear abnormality of cervix 2009    +HPV    Pap smear abnormality of cervix 2010    +HPV (16,18 negative)    Pap smear abnormality of cervix 01/2011    ASCUS, HPV +, repap in 8/11 and 10/13 - WNL    Psychiatric disorder     Anxiety    Scoliosis 09/2011    T4-Ilium Thoracolumbar Fusion (DR Brice Gonzalez, DR REHMAN)      PSH:  Past Surgical History:   Procedure Laterality Date    BREAST SURGERY PROCEDURE UNLISTED  1997    IMPLANTS    CKC, AKA COLD KNIFE CONE  1992    HX GYN  2008    laser treatment of cervix    HX ORTHOPAEDIC  09/2011    T4-Ilium Thoracolumbar Fusion (DR Brice Gonzalez, DR REHMAN)    HX WISDOM TEETH EXTRACTION      IMPLANT BREAST SILICONE/EQ Bilateral     1997      Social History:  Social History   Substance Use Topics    Smoking status: Former Smoker     Quit date: 2016    Smokeless tobacco: Never Used    Alcohol use 1.8 oz/week     3 Standard drinks or equivalent per week      Comment: OCCASIONALLY      Family History:  Family History   Problem Relation Age of Onset    Cancer Mother     Breast Cancer Mother 64    Cancer Father      THROAT    Seizures Sister     Breast Cancer Maternal Grandmother      [de-identified]    Breast Cancer Maternal Aunt      60's    Anesth Problems Neg Hx       Medications: (reviewed)  Current Outpatient Prescriptions   Medication Sig     mg tablet TAKE 1 TABLET BY MOUTH EVERY 8 HOURS AS NEEDED FOR PAIN    docusate sodium (COLACE) 100 mg capsule Take 1 Cap by mouth two (2) times a day. Hold for loose stools.  oxyCODONE (OXYIR) 5 mg capsule Take 1 Cap by mouth every four (4) hours as needed. Max Daily Amount: 30 mg.    acetaminophen (TYLENOL) 325 mg tablet Take 2 Tabs by mouth every four (4) hours as needed for Pain. No current facility-administered medications for this visit. Allergies: (reviewed)  No Known Allergies       OBJECTIVE:    Physical Exam:  Visit Vitals    /81 (BP 1 Location: Left arm, BP Patient Position: Sitting)    Pulse 66    Ht 5' 6.5\" (1.689 m)    Wt 154 lb (69.9 kg)    BMI 24.48 kg/m2       General: Alert and oriented. No acute distress. Well-nourished  Gastrointestinal: soft, non-tender, non-distended, no masses or organomegaly. Well-healed port site incisions. Musculoskeletal: normal gait. No joint tenderness, deformity or swelling. No muscular tenderness. Extremities: extremities normal, atraumatic, no cyanosis or edema. Pelvic: deferred   Neuro: Grossly intact. Normal gait and movement. No acute deficit  Skin: No evidence of rashes or skin changes. IMPRESSION/PLAN:    Dinorah Fabian is a 48 y.o. female who on 9/6/18 underwent Laparoscopic resection of adnexal mass, right salpingo-oophorectomy. 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 Prior to surgery the patient had declined hysterectomy or other procedures if there was no evidence of malignancy. Normal appearing adnexa and uterus otherwise at the time of surgery. Problem List Items Addressed This Visit        Reproductive    Cystadenoma of right ovary - Primary        Doing well postoperatively. Reviewed benign pathology. Healing well. Patient may be discharged from Gynecologic Oncology. Encouraged patient to continue routine gynecologic care with her ObGyn. All questions and concerns were addressed with the patient and she is comfortable with the plan.       Vonda Lake MD

## 2018-10-04 NOTE — PROGRESS NOTES
Post op Visit, surgery was on 9/6/18    1. Have you been to the ER, urgent care clinic since your last visit? Hospitalized since your last visit?  no    2. Have you seen or consulted any other health care providers outside of the [address] [place] [address] since your last visit? Include any pap smears or colon screening.    no

## 2018-10-05 ENCOUNTER — TELEPHONE (OUTPATIENT)
Dept: GYNECOLOGY | Age: 53
End: 2018-10-05

## 2018-10-05 NOTE — TELEPHONE ENCOUNTER
Pt  to see if she could get back to exercising on treadmill, per note no restrictions, release from gyn oncology, I advised pt she could start exercising on treadmill, start off slowly to get back into routine and to listen to her body and rest as needed, pt verbalized understanding

## 2018-12-13 ENCOUNTER — OFFICE VISIT (OUTPATIENT)
Dept: SURGERY | Age: 53
End: 2018-12-13

## 2018-12-13 VITALS
BODY MASS INDEX: 24.33 KG/M2 | WEIGHT: 155 LBS | HEART RATE: 76 BPM | DIASTOLIC BLOOD PRESSURE: 89 MMHG | SYSTOLIC BLOOD PRESSURE: 129 MMHG | HEIGHT: 67 IN

## 2018-12-13 DIAGNOSIS — N60.11 FIBROCYSTIC BREAST CHANGES OF BOTH BREASTS: Primary | ICD-10-CM

## 2018-12-13 DIAGNOSIS — N60.12 FIBROCYSTIC BREAST CHANGES OF BOTH BREASTS: Primary | ICD-10-CM

## 2018-12-13 DIAGNOSIS — Z80.3 FAMILY HISTORY OF BREAST CANCER: ICD-10-CM

## 2018-12-13 DIAGNOSIS — Z98.82 H/O BILATERAL BREAST IMPLANTS: ICD-10-CM

## 2018-12-13 NOTE — PROGRESS NOTES
HISTORY OF PRESENT ILLNESS  Mely Carl is a 48 y.o. female. HPI ESTABLISHED patient here for yearly follow up for high risk screening breast cancer. No breast problems at this time. In September, she had a mass removed from her RIGHT ovary (Dr. Clinton Dandy at [facility]) and she is feeling much better since it was removed.     Breast history-  - BILATERAL breast implants    OB History      Para Term  AB Living    2 2            SAB TAB Ectopic Molar Multiple Live Births                       Obstetric Comments    Menarche:  13. LMP: current. # of Children:  2. Age at Delivery of First Child:  35.   Hysterectomy/oophorectomy:  NO/NO. Breast Bx:  no.  Hx of Breast Feeding:  yes. BCP:  Yes, IUD. Hormone therapy:  no.             Past Surgical History:   Procedure Laterality Date    BREAST SURGERY PROCEDURE UNLISTED      IMPLANTS    CKC, AKA COLD KNIFE CONE      HX GYN  2008    laser treatment of cervix    HX ORTHOPAEDIC  2011    T4-Ilium Thoracolumbar Fusion (DR Evan Evans, DR REHMAN)    HX WISDOM TEETH EXTRACTION      IMPLANT BREAST SILICONE/EQ Bilateral           FH includes-  Mother diagnosed with breast cancer at age 62, survivor. Maternal aunt diagnosed with breast cancer at age 2615 [facility], . Maternal grandmother diagnosed with breast cancer at age [de-identified], . Patient has had genetic testing- BRCA negative. 2016- calculated risk using the Tyrer Cuzick model. Her 10 year risk is 7%(compared with a population risk of 2.7%). Her lifetime risk is 27.7% (compared with a population risk of 11.4%).       Breast imaging-  Bilateral screening mammogram on 2/10/17- BIRADS 2  Reports BSmammogram 3D at [facility] this month - normal    ROS    Physical Exam   Constitutional: She appears well-developed and well-nourished. Pulmonary/Chest: Right breast exhibits no inverted nipple, no mass, no nipple discharge, no skin change and no tenderness.  Left breast exhibits no inverted nipple, no mass, no nipple discharge, no skin change and no tenderness. Breasts are symmetrical.   Musculoskeletal: Normal range of motion. UE x 2   Lymphadenopathy:     She has no axillary adenopathy. Skin: Skin is warm, dry and intact. Chest and breasts examined   Psychiatric: She has a normal mood and affect. Her speech is normal and behavior is normal.     Visit Vitals  /89   Pulse 76   Ht 5' 6.5\" (1.689 m)   Wt 155 lb (70.3 kg)   BMI 24.64 kg/m²     ASSESSMENT and PLAN  Encounter Diagnoses   Name Primary?  Fibrocystic breast changes of both breasts Yes    Family history of breast cancer     H/O bilateral breast implants      Normal exam and patient reports a normal 3D mammogram at [facility] this month. Discussed 2D vs 3D breast imaging. She will plan to have a BSmammogram 3D and RTC here in 1 year. She is comfortable with this plan. All questions answered and she stated understanding.

## 2018-12-13 NOTE — PATIENT INSTRUCTIONS
Learning About Breast Cancer Screening  What is breast cancer screening? Breast cancer occurs when cells that are not normal grow in one or both of your breasts. Screening tests can help find breast cancer early. Cancer is easier to treat when it's found early. Having concerns about breast cancer is common. That's why it's important to talk with your doctor about when to start and how often to get screened for breast cancer. How is breast cancer screening done? Several screening tests can be used to check for breast cancer. · Mammograms check for signs of cancer using X-rays. They can show tumors that are too small for you or your doctor to feel. During a mammogram, a machine squeezes your breasts to make them flatter and easier to X-ray. At least two pictures are taken of each breast. One is taken from the top and one from the side. · 3-D mammograms are also called digital breast tomosynthesis. Your breast is positioned on a flat plate. A top plate is pressed against your breast to keep it in position. The X-ray arm then moves in an arc above the breast and takes many pictures. A computer uses these X-rays to create a three-dimensional image. · Clinical breast exams are a doctor's exam. Your doctor carefully feels your breasts and under your arms to check for lumps or other changes. After the screening, your doctor will tell you the results. You will also be told if you need any follow-up tests. When should you get screened? Talk with your doctor about when you should start being tested for breast cancer. How often you get tested and the kind of tests you get will depend on your age and your risk. The guidelines that follow are for women who have an average risk for breast cancer. If you have a higher risk for breast cancer, such as having a family history of breast cancer in multiple relatives or at a young age, your doctor may recommend different screening for you.   · Ages 21 to 44: Some experts recommend that women have a clinical breast exam every 3 years, starting at age 21. Ask your doctor how often you should have this test. If you have a high risk for breast cancer, talk with your doctor about when to start yearly mammograms and other screening tests. · Ages 36 and older: Talk with your doctor about how often you should have mammograms and clinical breast exams. What is your risk for breast cancer? If you don't already know your risk of breast cancer, you can ask your doctor about it. You can also look it up at www.cancer.gov/bcrisktool/. If your doctor says that you have a high or very high risk, ask about ways to reduce your risk. These could include getting extra screening, taking medicine, or having surgery. If you have a strong family history of breast cancer, ask your doctor about genetic testing. What steps can you take to stay healthy? Some things that increase your risk of breast cancer, such as your age and being female, cannot be controlled. But you can do some things to stay as healthy as you can. · Learn what your breasts normally look and feel like. If you notice any changes, tell your doctor. · Drink alcohol wisely. Your risk goes up the more you drink. For the best health, women should have no more than 1 drink a day or 7 drinks a week. · If you smoke, quit. When you quit smoking, you lower your chances of getting many types of cancer. You can also do your best to eat well, be active, and stay at a healthy weight. Eating healthy foods and being active every day, as well as staying at a healthy weight, may help prevent cancer. Where can you learn more? Go to http://pau-olivia.info/. Enter L566 in the search box to learn more about \"Learning About Breast Cancer Screening. \"  Current as of: March 28, 2018  Content Version: 11.8  © 0695-3226 Healthwise, [employer].  Care instructions adapted under license by [employer] (which disclaims liability or warranty for this information). If you have questions about a medical condition or this instruction, always ask your healthcare professional. [place] [address] any warranty or liability for your use of this information.

## 2019-04-08 ENCOUNTER — TELEPHONE (OUTPATIENT)
Dept: GYNECOLOGY | Age: 54
End: 2019-04-08

## 2019-04-08 NOTE — TELEPHONE ENCOUNTER
Pt  and states she is having the same symptoms she had in August 2018 that brought her to Dr. Jackie Martinez, she states she has gained 5 to 6 pounds over a short period of time, bloating, her upper abdomen is hard, she states she has made an appt with her gyn for tomorrow 4/9/19, should she see gyn or come back here, per Dr. Jackie Martinez, keep appt with gyn and call us with what they find/recommend.   If she needs to be referred back to our office we will make her an appt, pt verbalized understanding and will call me back after her appt tomorrow 4/9/19

## 2019-04-12 ENCOUNTER — HOSPITAL ENCOUNTER (OUTPATIENT)
Dept: ULTRASOUND IMAGING | Age: 54
Discharge: HOME | End: 2019-04-12
Attending: OBSTETRICS & GYNECOLOGY
Payer: MEDICAID

## 2019-04-12 DIAGNOSIS — R14.0 ABDOMINAL DISTENSION (GASEOUS): ICD-10-CM

## 2019-04-12 PROCEDURE — 76705 ECHO EXAM OF ABDOMEN: CPT

## 2019-05-02 ENCOUNTER — HOSPITAL ENCOUNTER (OUTPATIENT)
Dept: CT IMAGING | Age: 54
Discharge: HOME | End: 2019-05-02
Attending: INTERNAL MEDICINE
Payer: MEDICAID

## 2019-05-02 DIAGNOSIS — R93.5 ABNORMAL ULTRASOUND OF ABDOMEN: ICD-10-CM

## 2019-05-02 DIAGNOSIS — R10.9 ABDOMINAL PAIN: ICD-10-CM

## 2019-05-02 DIAGNOSIS — Z12.11 ENCOUNTER FOR SCREENING COLONOSCOPY: ICD-10-CM

## 2019-05-02 PROCEDURE — 74177 CT ABD & PELVIS W/CONTRAST: CPT

## 2019-05-02 PROCEDURE — 74011000255 HC RX REV CODE- 255: Performed by: INTERNAL MEDICINE

## 2019-05-02 PROCEDURE — 74011636320 HC RX REV CODE- 636/320: Performed by: INTERNAL MEDICINE

## 2019-05-02 RX ORDER — BARIUM SULFATE 20 MG/ML
900 SUSPENSION ORAL
Status: COMPLETED | OUTPATIENT
Start: 2019-05-02 | End: 2019-05-02

## 2019-05-02 RX ORDER — SODIUM CHLORIDE 0.9 % (FLUSH) 0.9 %
10 SYRINGE (ML) INJECTION
Status: COMPLETED | OUTPATIENT
Start: 2019-05-02 | End: 2019-05-02

## 2019-05-02 RX ADMIN — IOPAMIDOL 100 ML: 755 INJECTION, SOLUTION INTRAVENOUS at 13:19

## 2019-05-02 RX ADMIN — Medication 10 ML: at 13:19

## 2019-05-02 RX ADMIN — BARIUM SULFATE 900 ML: 21 SUSPENSION ORAL at 13:19

## 2019-06-06 ENCOUNTER — HOSPITAL ENCOUNTER (OUTPATIENT)
Dept: NUCLEAR MEDICINE | Age: 54
Discharge: HOME | End: 2019-06-06
Attending: INTERNAL MEDICINE
Payer: MEDICAID

## 2019-06-06 VITALS — WEIGHT: 160 LBS | BODY MASS INDEX: 25.44 KG/M2

## 2019-06-06 DIAGNOSIS — R10.11 RUQ PAIN: ICD-10-CM

## 2019-06-06 PROCEDURE — 74011250636 HC RX REV CODE- 250/636: Performed by: INTERNAL MEDICINE

## 2019-06-06 PROCEDURE — 78227 HEPATOBIL SYST IMAGE W/DRUG: CPT

## 2019-06-06 RX ADMIN — SINCALIDE 1.45 MCG: 5 INJECTION, POWDER, LYOPHILIZED, FOR SOLUTION INTRAVENOUS at 12:17

## 2019-06-11 RX ORDER — PREDNISONE 20 MG/1
60 TABLET ORAL
COMMUNITY
End: 2021-01-03

## 2019-06-12 ENCOUNTER — HOSPITAL ENCOUNTER (OUTPATIENT)
Age: 54
Setting detail: OUTPATIENT SURGERY
Discharge: HOME | End: 2019-06-12
Attending: INTERNAL MEDICINE | Admitting: INTERNAL MEDICINE
Payer: MEDICAID

## 2019-06-12 ENCOUNTER — ANESTHESIA EVENT (OUTPATIENT)
Dept: ENDOSCOPY | Age: 54
End: 2019-06-12
Payer: MEDICAID

## 2019-06-12 ENCOUNTER — ANESTHESIA (OUTPATIENT)
Dept: ENDOSCOPY | Age: 54
End: 2019-06-12
Payer: MEDICAID

## 2019-06-12 VITALS
DIASTOLIC BLOOD PRESSURE: 77 MMHG | RESPIRATION RATE: 23 BRPM | OXYGEN SATURATION: 95 % | WEIGHT: 159.38 LBS | HEART RATE: 58 BPM | TEMPERATURE: 97.6 F | HEIGHT: 67 IN | BODY MASS INDEX: 25.01 KG/M2 | SYSTOLIC BLOOD PRESSURE: 142 MMHG

## 2019-06-12 PROCEDURE — 76040000007: Performed by: INTERNAL MEDICINE

## 2019-06-12 PROCEDURE — 77030010936 HC CLP LIG BSC -C: Performed by: INTERNAL MEDICINE

## 2019-06-12 PROCEDURE — 77030021593 HC FCPS BIOP ENDOSC BSC -A: Performed by: INTERNAL MEDICINE

## 2019-06-12 PROCEDURE — 88305 TISSUE EXAM BY PATHOLOGIST: CPT

## 2019-06-12 PROCEDURE — 77030013992 HC SNR POLYP ENDOSC BSC -B: Performed by: INTERNAL MEDICINE

## 2019-06-12 PROCEDURE — 74011250636 HC RX REV CODE- 250/636

## 2019-06-12 PROCEDURE — 76060000032 HC ANESTHESIA 0.5 TO 1 HR: Performed by: INTERNAL MEDICINE

## 2019-06-12 PROCEDURE — 77030038665 HC SOL ELEVIEW INJ AGNT ARPH -B: Performed by: INTERNAL MEDICINE

## 2019-06-12 PROCEDURE — 74011250636 HC RX REV CODE- 250/636: Performed by: INTERNAL MEDICINE

## 2019-06-12 PROCEDURE — 77030019988 HC FCPS ENDOSC DISP BSC -B: Performed by: INTERNAL MEDICINE

## 2019-06-12 PROCEDURE — 77030003657 HC NDL SCLER BSC -B: Performed by: INTERNAL MEDICINE

## 2019-06-12 RX ORDER — SODIUM CHLORIDE 0.9 % (FLUSH) 0.9 %
5-40 SYRINGE (ML) INJECTION EVERY 8 HOURS
Status: DISCONTINUED | OUTPATIENT
Start: 2019-06-12 | End: 2019-06-12 | Stop reason: SDUPTHER

## 2019-06-12 RX ORDER — NALOXONE HYDROCHLORIDE 0.4 MG/ML
0.4 INJECTION, SOLUTION INTRAMUSCULAR; INTRAVENOUS; SUBCUTANEOUS
Status: DISCONTINUED | OUTPATIENT
Start: 2019-06-12 | End: 2019-06-12 | Stop reason: HOSPADM

## 2019-06-12 RX ORDER — PROPOFOL 10 MG/ML
INJECTION, EMULSION INTRAVENOUS AS NEEDED
Status: DISCONTINUED | OUTPATIENT
Start: 2019-06-12 | End: 2019-06-12 | Stop reason: HOSPADM

## 2019-06-12 RX ORDER — ATROPINE SULFATE 0.1 MG/ML
0.5 INJECTION INTRAVENOUS
Status: DISCONTINUED | OUTPATIENT
Start: 2019-06-12 | End: 2019-06-12 | Stop reason: HOSPADM

## 2019-06-12 RX ORDER — EPINEPHRINE 0.1 MG/ML
1 INJECTION INTRACARDIAC; INTRAVENOUS
Status: DISCONTINUED | OUTPATIENT
Start: 2019-06-12 | End: 2019-06-12 | Stop reason: HOSPADM

## 2019-06-12 RX ORDER — FENTANYL CITRATE 50 UG/ML
50 INJECTION, SOLUTION INTRAMUSCULAR; INTRAVENOUS
Status: DISCONTINUED | OUTPATIENT
Start: 2019-06-12 | End: 2019-06-12 | Stop reason: HOSPADM

## 2019-06-12 RX ORDER — SODIUM CHLORIDE 0.9 % (FLUSH) 0.9 %
5-40 SYRINGE (ML) INJECTION AS NEEDED
Status: DISCONTINUED | OUTPATIENT
Start: 2019-06-12 | End: 2019-06-12 | Stop reason: SDUPTHER

## 2019-06-12 RX ORDER — MIDAZOLAM HYDROCHLORIDE 1 MG/ML
.25-5 INJECTION, SOLUTION INTRAMUSCULAR; INTRAVENOUS
Status: DISCONTINUED | OUTPATIENT
Start: 2019-06-12 | End: 2019-06-12 | Stop reason: HOSPADM

## 2019-06-12 RX ORDER — FLUMAZENIL 0.1 MG/ML
0.2 INJECTION INTRAVENOUS
Status: DISCONTINUED | OUTPATIENT
Start: 2019-06-12 | End: 2019-06-12 | Stop reason: HOSPADM

## 2019-06-12 RX ORDER — FLUMAZENIL 0.1 MG/ML
0.2 INJECTION INTRAVENOUS
Status: DISCONTINUED | OUTPATIENT
Start: 2019-06-12 | End: 2019-06-12 | Stop reason: SDUPTHER

## 2019-06-12 RX ORDER — LIDOCAINE HYDROCHLORIDE 20 MG/ML
INJECTION, SOLUTION EPIDURAL; INFILTRATION; INTRACAUDAL; PERINEURAL AS NEEDED
Status: DISCONTINUED | OUTPATIENT
Start: 2019-06-12 | End: 2019-06-12 | Stop reason: HOSPADM

## 2019-06-12 RX ORDER — NALOXONE HYDROCHLORIDE 0.4 MG/ML
0.4 INJECTION, SOLUTION INTRAMUSCULAR; INTRAVENOUS; SUBCUTANEOUS
Status: DISCONTINUED | OUTPATIENT
Start: 2019-06-12 | End: 2019-06-12 | Stop reason: SDUPTHER

## 2019-06-12 RX ORDER — DEXTROMETHORPHAN/PSEUDOEPHED 2.5-7.5/.8
1.2 DROPS ORAL
Status: DISCONTINUED | OUTPATIENT
Start: 2019-06-12 | End: 2019-06-12 | Stop reason: HOSPADM

## 2019-06-12 RX ORDER — SODIUM CHLORIDE 0.9 % (FLUSH) 0.9 %
5-40 SYRINGE (ML) INJECTION AS NEEDED
Status: DISCONTINUED | OUTPATIENT
Start: 2019-06-12 | End: 2019-06-12 | Stop reason: HOSPADM

## 2019-06-12 RX ORDER — SODIUM CHLORIDE 0.9 % (FLUSH) 0.9 %
5-40 SYRINGE (ML) INJECTION EVERY 8 HOURS
Status: DISCONTINUED | OUTPATIENT
Start: 2019-06-12 | End: 2019-06-12 | Stop reason: HOSPADM

## 2019-06-12 RX ORDER — SODIUM CHLORIDE 9 MG/ML
75 INJECTION, SOLUTION INTRAVENOUS CONTINUOUS
Status: DISCONTINUED | OUTPATIENT
Start: 2019-06-12 | End: 2019-06-12 | Stop reason: HOSPADM

## 2019-06-12 RX ADMIN — SODIUM CHLORIDE 75 ML/HR: 900 INJECTION, SOLUTION INTRAVENOUS at 13:45

## 2019-06-12 RX ADMIN — PROPOFOL 500 MG: 10 INJECTION, EMULSION INTRAVENOUS at 14:25

## 2019-06-12 RX ADMIN — LIDOCAINE HYDROCHLORIDE 100 MG: 20 INJECTION, SOLUTION EPIDURAL; INFILTRATION; INTRACAUDAL; PERINEURAL at 13:46

## 2019-06-12 NOTE — ANESTHESIA PREPROCEDURE EVALUATION
Anesthetic History     PONV          Review of Systems / Medical History  Patient summary reviewed, nursing notes reviewed and pertinent labs reviewed    Pulmonary  Within defined limits                 Neuro/Psych         Psychiatric history     Cardiovascular  Within defined limits                     GI/Hepatic/Renal  Within defined limits              Endo/Other  Within defined limits      Arthritis     Other Findings              Physical Exam    Airway  Mallampati: II  TM Distance: > 6 cm  Neck ROM: normal range of motion   Mouth opening: Normal     Cardiovascular  Regular rate and rhythm,  S1 and S2 normal,  no murmur, click, rub, or gallop             Dental    Dentition: Upper braces and Lower braces     Pulmonary  Breath sounds clear to auscultation               Abdominal  GI exam deferred       Other Findings            Anesthetic Plan    ASA: 2  Anesthesia type: general and total IV anesthesia          Induction: Intravenous  Anesthetic plan and risks discussed with: Patient      Propofol MAC

## 2019-06-12 NOTE — ROUTINE PROCESS
Kody White  1965  [id number]    Situation:  Verbal report received from: Cyril Queen RN  Procedure: Procedure(s):  COLONOSCOPY  ESOPHAGOGASTRODUODENAL (EGD) BIOPSY  RESOLUTION CLIP  INJECTION of eleview  ENDOSCOPIC POLYPECTOMY  ESOPHAGOGASTRODUODENOSCOPY (EGD)  COLON BIOPSY    Background:    Preoperative diagnosis: ABD PAIN, SCREENING, ABNORMAL ULTRASOUND OF ABDOMEN  Postoperative diagnosis: egd - hiatal hernia, gastrits  colon - polyp, terminal ileitis, hemorrhoids    :  Dr. Victorino Kocher  Assistant(s): Endoscopy Technician-1: Yang Ballard  Endoscopy RN-1: Rome Shelton RN    Specimens:   ID Type Source Tests Collected by Time Destination   1 : Duodenum BX Preservative Duodenum  Luiz Liu MD 6/12/2019 1353 Pathology   2 : Gastric BX Preservative Gastric  Luiz Liu MD 6/12/2019 1354 Pathology   3 : Terminal ileum BX Preservative Ileum, Terminal  Luiz Liu MD 6/12/2019 1430 Pathology   4 : Random colon BX Preservative Random colon  Luiz Liu MD 6/12/2019 1432 Pathology   5 : Ascending colon polyp Preservative Colon, Ascending  Luiz Liu MD 6/12/2019 1433 Pathology     H. Pylori      Assessment:  Intra-procedure medications       Anesthesia gave intra-procedure sedation and medications, see anesthesia flow sheet     Intravenous fluids: NS@ KVO     Vital signs stable     Abdominal assessment: round and soft     Recommendation:  Discharge patient per MD order  Family or Friend   Permission to share finding with family or friend yes

## 2019-06-12 NOTE — DISCHARGE INSTRUCTIONS
Rahel Lopez  [id number]  1965    COLON DISCHARGE INSTRUCTIONS  Discomfort:  Redness at IV site- apply warm compress to area; if redness or soreness persist- contact your physician  There may be a slight amount of blood passed from the rectum  Gaseous discomfort- walking, belching will help relieve any discomfort  You may not operate a vehicle for 12 hours  You may not engage in an occupation involving machinery or appliances for rest of today  You may not drink alcoholic beverages for at least 12 hours  Avoid making any critical decisions for at least 24 hour  DIET:   Regular diet. - however -  remember your colon is empty and a heavy meal will produce gas. Avoid these foods:  vegetables, fried / greasy foods, carbonated drinks for today  MEDICATION:  Per Medication Reconciliation       ACTIVITY:  You may not resume your normal daily activities until tomorrow AM; it is recommended that you spend the remainder of the day resting -  avoid any strenuous activity. CALL M.D. ANY SIGN OF:   Increasing pain, nausea, vomiting  Abdominal distension (swelling)  New increased bleeding (oral or rectal)  Fever (chills)  Pain in chest area  Bloody discharge from nose or mouth  Shortness of breath    You may not  take any Advil, Aspirin, Ibuprofen, Motrin, Aleve, or Goodys for 10 days, ONLY  Tylenol as needed for pain. IMPRESSION:  EGD:  Impression:    1. Normal proximal and mid esophagus  2. Large, mixed hiatal hernia. 3. Mild, segmental, non-erosive gastropathy in body and antrum. Biopsied  4. Stomach otherwise normal, including retroflexion  5. Normal duodenal bulb and 2nd portion of the duodenum. Biopsied     Recommendations:  1. Follow up pathology  2. Consider workup of hiatal hernia depending on pathology (I.e. Barium Swallow and pH/Manometry)    Colon:  Impression:    1. Mild, patchy, erosive terminal ileitis. Biopsied  2. 12 mm flat polyp in ascending colon.  '[place]'-life followed by en bloc snare cautery polypectomy. Prophylactic clip placed. 3. Small internal hemorrhoids  4. Otherwise normal colonoscopy through to the cecum. Biopsies taken of whole colon    Recommendations:   1. Avoid non-essential NSAID's  2. Follow up pathology. Consider CT Enterography based on pathology  3.  Repeat Colonoscopy in 3 years for surveillance    Follow-up Instructions:   Call Dr. Kwadwo Bustamante for the results of procedure / biopsy in 7-10 days  Telephone # [phone]    Torrey Mcclelland MD

## 2019-06-12 NOTE — PROCEDURES
NAME:  Shireen Huynh   :   1965   MRN:   [id number]     Date/Time:  2019 1:58 PM    Esophagogastroduodenoscopy (EGD) Procedure Note    Procedure: Esophagogastroduodenoscopy with biopsy    Indication: Abdominal pain, generalized  Pre-operative Diagnosis: see indication above  Post-operative Diagnosis: see findings below  :  Radha Bailey MD  Referring Provider:   --Anjali Brunson MD    Exam:  Airway: clear, no airway problems anticipated  Heart: RRR, without gallops or rubs  Lungs: clear bilaterally without wheezes, crackles, or rhonchi  Abdomen: soft, nontender, nondistended, bowel sounds present  Mental Status: awake, alert and oriented to person, place and time     Anethesia/Sedation:  MAC anesthesia Propofol  Procedure Details   After informed consent was obtained for the procedure, with all risks and benefits of procedure explained the patient was taken to the endoscopy suite and placed in the left lateral decubitus position. Following sequential administration of sedation as per above, the [id number] gastroscope was inserted into the mouth and advanced under direct vision to third portion of the duodenum. A careful inspection was made as the gastroscope was withdrawn, including a retroflexed view of the proximal stomach; findings and interventions are described below. Findings:  1. Normal proximal and mid esophagus  2. Large, mixed hiatal hernia. 3. Mild, segmental, non-erosive gastropathy in body and antrum. Biopsied  4. Stomach otherwise normal, including retroflexion  5. Normal duodenal bulb and 2nd portion of the duodenum. Biopsied    Therapies:  1. Biopsies    Specimens: 1. Duodenal 2. Gastric    EBL:  None. Complications:   None; patient tolerated the procedure well. Impression:    1. Normal proximal and mid esophagus  2. Large, mixed hiatal hernia. 3. Mild, segmental, non-erosive gastropathy in body and antrum. Biopsied  4.  Stomach otherwise normal, including retroflexion  5. Normal duodenal bulb and 2nd portion of the duodenum. Biopsied    Recommendations:  1. Follow up pathology  2.  Consider workup of hiatal hernia depending on pathology (I.e. Barium Swallow and pH/Manometry)    Discharge disposition:  For colonoscopy    Roel Huerta MD

## 2019-06-12 NOTE — PROCEDURES
NAME:  Joshua Garcia   :   1965   MRN:   [id number]     Date/Time:  2019 2:27 PM    Colonoscopy Operative Report    Procedure Type:   Colonoscopy with biopsy, polypectomy (snare cautery)     Indications:     Screening colonoscopy  Pre-operative Diagnosis: see indication above  Post-operative Diagnosis:  See findings below  :  Sanket Munoz MD  Referring Provider: --Taran Napier MD    Exam:  Airway: clear, no airway problems anticipated  Heart: RRR, without gallops or rubs  Lungs: clear bilaterally without wheezes, crackles, or rhonchi  Abdomen: soft, nontender, nondistended, bowel sounds present  Mental Status: awake, alert and oriented to person, place and time    Sedation:  MAC anesthesia Propofol  Procedure Details:  After informed consent was obtained with all risks and benefits of procedure explained and preoperative exam completed, the patient was taken to the endoscopy suite and placed in the left lateral decubitus position. Upon sequential sedation as per above, a digital rectal exam was performed demonstrating internal hemorrhoids. The Olympus videocolonoscope  was inserted in the rectum and carefully advanced to the terminal ileum. The quality of preparation was good. The colonoscope was slowly withdrawn with careful evaluation between folds. Retroflexion in the rectum was completed demonstrating internal hemorrhoids. Findings:   1. Mild, patchy, erosive terminal ileitis. Biopsied  2. 12 mm flat polyp in ascending colon. '[place] Carnes followed by en bloc snare cautery polypectomy. Prophylactic clip placed. 3. Small internal hemorrhoids  4. Otherwise normal colonoscopy through to the cecum. Biopsies taken of whole colon    Specimen Removed:  1. Terminal ileum 2. Whole colon 3. Ascending colon polyp  Complications: None. EBL:  None. Impression:    1. Mild, patchy, erosive terminal ileitis. Biopsied  2. 12 mm flat polyp in ascending colon.  'Avel[place] Carnes followed by en bloc snare cautery polypectomy. Prophylactic clip placed. 3. Small internal hemorrhoids  4. Otherwise normal colonoscopy through to the cecum. Biopsies taken of whole colon    Recommendations:   1. Avoid non-essential NSAID's  2. Follow up pathology. Consider CT Enterography based on pathology  3. Repeat Colonoscopy in 3 years for surveillance    Discharge Disposition:  Home in the company of a  when able to ambulate.       Raghu Stafford MD

## 2019-06-12 NOTE — H&P
Gastroenterology Outpatient History and Physical    Patient: Juanjo Belcher    Physician: Jihan Gallo MD    Chief Complaint: AP and CRC screening  History of Present Illness: No other complaints    History:  Past Medical History:   Diagnosis Date    Arthritis     SPINE    Chronic pain     PELVIC    Ileus ([address]     after spinal fusion    Nausea & vomiting     Nerve compression     neck and left arm    Ovarian cyst     Pap smear abnormality of cervix 06/2008    + HPV, laser treatment for cervix in 1990, [address] 6/08 negative    Pap smear abnormality of cervix 01/06/2009    +HPV    Pap smear abnormality of cervix 09/13/2010    +HPV (16,18 negative)    Pap smear abnormality of cervix 01/2011    ASCUS, HPV +, repap in 8/11 and 10/13 - WNL    Psychiatric disorder     Anxiety    Scoliosis 09/2011    T4-Ilium Thoracolumbar Fusion (DR Jarred Crawford, DR REHMAN)      Past Surgical History:   Procedure Laterality Date    BREAST SURGERY PROCEDURE UNLISTED  1997    IMPLANTS    CKC, AKA COLD KNIFE CONE  1992    HX GYN  2008    laser treatment of cervix    HX ORTHOPAEDIC  09/2011    T4-Ilium Thoracolumbar Fusion (DR Jarred Crawford, DR REHMAN)    HX OTHER SURGICAL      ovarian cyst    HX WISDOM TEETH EXTRACTION      IMPLANT BREAST SILICONE/EQ Bilateral     1997      Social History     Socioeconomic History    Marital status: SINGLE     Spouse name: Not on file    Number of children: Not on file    Years of education: Not on file    Highest education level: Not on file   Tobacco Use    Smoking status: Former Smoker     Last attempt to quit: 2016     Years since quitting: 3.4    Smokeless tobacco: Never Used   Substance and Sexual Activity    Alcohol use:  Yes     Alcohol/week: 1.8 oz     Types: 3 Standard drinks or equivalent per week     Comment: OCCASIONALLY    Drug use: No    Sexual activity: Yes     Birth control/protection: IUD      Family History   Problem Relation Age of Onset    Cancer Mother  Breast Cancer Mother 64    Cancer Father         THROAT    Seizures Sister     Breast Cancer Maternal Grandmother         [de-identified]    Breast Cancer Maternal Aunt         60's    Anesth Problems Neg Hx       Patient Active Problem List   Diagnosis Code    Scoliosis M41.9    Chronic pain G89.29    Arthritis M19.90    Ovarian mass, right N83.9    History of abnormal cervical Pap smear Z87.898    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 8XXA       Allergies: No Known Allergies  Medications:   Prior to Admission medications    Medication Sig Start Date End Date Taking? Authorizing Provider   predniSONE (DELTASONE) 20 mg tablet Take 60 mg by mouth daily (with breakfast). Yes Provider, Historical     Physical Exam:   Vital Signs: Blood pressure 117/70, pulse 64, temperature 98.2 °F (36.8 °C), resp. rate 21, height 5' 7\" (1.702 m), weight 72.3 kg (159 lb 6 oz), SpO2 100 %, not currently breastfeeding.   General: well developed, well nourished   HEENT: unremarkable   Heart: regular rhythm no mumur    Lungs: clear   Abdominal:  benign   Neurological: unremarkable   Extremities: no edema     Findings/Diagnosis: AP/CRC screening  Plan of Care/Planned Procedure: EGD/Colon with conscious/deep sedation    Signed:  Jihan Gallo MD 6/12/2019

## 2019-06-12 NOTE — PERIOP NOTES
Endoscope was pre-cleaned at the bedside immediately following procedure by Adriana Fung    Anesthesia reports 500mg Propofol, 100mg Lidocaine and 8000mL NS given during procedure. Received report from anesthesia staff on vital signs and status of patient. Tutu Nye

## 2019-06-12 NOTE — ANESTHESIA POSTPROCEDURE EVALUATION
Procedure(s):  COLONOSCOPY  ESOPHAGOGASTRODUODENAL (EGD) BIOPSY  RESOLUTION CLIP  INJECTION of [place]  ENDOSCOPIC POLYPECTOMY  ESOPHAGOGASTRODUODENOSCOPY (EGD)  COLON BIOPSY. total IV anesthesia    Anesthesia Post Evaluation        Patient location during evaluation: PACU  Note status: Adequate. Level of consciousness: responsive to verbal stimuli and sleepy but conscious  Pain management: satisfactory to patient  Airway patency: patent  Anesthetic complications: no  Cardiovascular status: acceptable  Respiratory status: acceptable  Hydration status: acceptable  Comments: +Post-Anesthesia Evaluation and Assessment    Patient: Jazmine Gillette MRN: [id number]  SSN: [id number]   YOB: 1965  Age: 47 y.o. Sex: female      Cardiovascular Function/Vital Signs    /69   Pulse (!) 53   Temp 36.4 °C (97.6 °F)   Resp 10   Ht 5' 7\" (1.702 m)   Wt 72.3 kg (159 lb 6 oz)   SpO2 99%   Breastfeeding? No   BMI 24.96 kg/m²     Patient is status post Procedure(s):  COLONOSCOPY  ESOPHAGOGASTRODUODENAL (EGD) BIOPSY  RESOLUTION CLIP  INJECTION of [place]  ENDOSCOPIC POLYPECTOMY  ESOPHAGOGASTRODUODENOSCOPY (EGD)  COLON BIOPSY. Nausea/Vomiting: Controlled. Postoperative hydration reviewed and adequate. Pain:  Pain Scale 1: Numeric (0 - 10) (06/12/19 1451)  Pain Intensity 1: 0 (06/12/19 1451)   Managed. Neurological Status: At baseline. Mental Status and Level of Consciousness: Arousable. Pulmonary Status:   O2 Device: Room air (06/12/19 1451)   Adequate oxygenation and airway patent. Complications related to anesthesia: None    Post-anesthesia assessment completed. No concerns.     Signed By: Vanessa Buckley MD    6/12/2019  Post anesthesia nausea and vomiting:  controlled      Vitals Value Taken Time   /52 6/12/2019  2:58 PM   Temp 36.4 °C (97.6 °F) 6/12/2019  2:44 PM   Pulse 51 6/12/2019  2:58 PM   Resp 15 6/12/2019  2:58 PM   SpO2 100 % 6/12/2019  2:58 PM   Vitals shown include unvalidated device data.

## 2019-07-26 NOTE — ROUTINE PROCESS
Clinic hours for Dr. Sen:  Monday 7:30am - 5pm  Tuesday Off  Wednesday 8am - 6pm  Thursday 7:30am - 5pm  Friday  7am - 4pm  3rd Saturday of each month by appointment only    If you need a refill on your prescription, please call your pharmacy and let them know. Please be proactive and call before your medication runs out. The pharmacy will then contact us for the refill. Please allow 24-48 hours for the refill to be processed.     If your physician has ordered additional laboratory or radiology testing as part of your ongoing plan of care, please allow 5-7 business days from the day of your lab draw or test for the results to be sent and reviewed by your provider. If your results are critical and require more immediate intervention, you will be contacted sooner. Your results will be conveyed to you via a phone call or letter.    You may be receiving a patient satisfaction survey in the mail or in your email.  If you receive an email survey, please look for the subject line of:   \" Your provider name\" would like your feedback\".   Please take the time to complete your survey either via the mail or email, as your feedback is very important to us.  We strive to make your experience exceptional and your comments help us with that goal.  We look forward to hearing from you.           Medicare Wellness Visit  Plan for Preventive Care    A good way for you to stay healthy is to use preventive care.  Medicare covers many services that can help you stay healthy.* The goal of these services is to find any health problems as quickly as possible. Finding problems early can help make them easier to treat.  Your personal plan below lists the services you may need and when they are due.     Health Maintenance Summary     Shingles Vaccine (1 of 2)  Overdue since 4/5/1976    DM/CKD Microalbumin (Yearly)  Overdue since 5/16/2019    Diabetes Foot Exam (Yearly)  Overdue since 5/16/2019    Medicare Wellness 65+ (Yearly)  Overdue  Patient: Liseth Ling MRN: [id number]  SSN: [id number] YOB: 1965  Age: 48 y.o. Sex: female Patient is status post Procedure(s): LAPAROSCOPIC RIGHT  SALPINGO OOPHRECTOMY RESECTION OF OVARIAN MASS. Surgeon(s) and Role: Shankar Oliva MD - Primary Local/Dose/Irrigation:  0.5% MARCAINE Peripheral IV 09/06/18 Right Wrist (Active) Airway - Endotracheal Tube 09/06/18 Oral (Active) Dressing/Packing:  Wound Abdomen-DRESSING TYPE: Topical skin adhesive/glue (09/06/18 1317) Splint/Cast:  ] since 5/16/2019    Depression Screening (Yearly)  Overdue since 5/16/2019    Pneumococcal Vaccine 65+ (2 of 2 - PPSV23)  Overdue since 5/16/2019    Diabetes A1C (Every 6 Months)  Overdue since 6/5/2019    DTaP/Tdap/Td Vaccine (1 - Tdap)  Postponed until 11/22/2019    Influenza Vaccine (1)  Next due on 9/1/2019    Diabetes Eye Exam (Yearly)  Next due on 11/1/2019    DM/CKD GFR (Yearly)  Next due on 6/6/2020    Meningococcal Vaccine   Aged Out           Preventive Care for Women and Men    Heart Screenings (Cardiovascular):  · Blood tests are used to check your cholesterol, lipid and triglyceride levels. High levels can increase your risk for heart disease and stroke. High levels can be treated with medications, diet and exercise. Lowering your levels can help keep your heart and blood vessels healthy.  Your provider will order these tests if they are needed.    · An ultrasound is done to see if you have an abdominal aortic aneurysm (AAA).  This is an enlargement of one of the main blood vessels that delivers blood to the body.   In the [place], 9,000 deaths are caused by AAA.  You may not even know you have this problem and as many as 1 in 3 people will have a serious problem if it is not treated.  Early diagnosis allows for more effective treatment and cure.  If you have a family history of AAA or are a male age 65-75 who has smoked, you are at higher risk of an AAA.  Your provider can order this test, if needed.    Colorectal Screening:  · There are many tests that are used to check for cancer of your colon and rectum. You and your provider should discuss what test is best for you and when to have it done.  Options include:  · Screening Colonoscopy: exam of the entire colon, seen through a flexible lighted tube.  · Flexible Sigmoidoscopy: exam of the last third (sigmoid portion) of the colon and rectum, seen through a flexible lighted tube.  · Cologuard DNA stool test: a sample of your stool is used to screen  for cancer and unseen blood in your stool.  · Fecal Occult Blood Test: a sample of your stool is studied to find any unseen blood    Flu Shot:  · An immunization that helps to prevent influenza (the flu). You should get this every year. The best time to get the shot is in the fall.    Pneumococcal Shot:  • Vaccines are available that can help prevent pneumococcal disease, which is any type of infection caused by Streptococcus pneumoniae bacteria.   Their use can prevent some cases of pneumonia, meningitis, and sepsis. There are two types of pneumococcal vaccines:   o Conjugate vaccines (PCV-13 or Prevnar 13®) - helps protect against the 13 types of pneumococcal bacteria that are the most common causes of serious infections in children and adults.    o Polysaccharide vaccine (PPSV23 or Uvgjrrwve59®) - helps protect against 23 types of pneumococcal bacteria for patients who are recommended to get it.  These vaccines should be given at least 12 months apart.  A booster is usually not needed.     Hepatitis B Shot:  · An immunization that helps to protect people from getting Hepatitis B. Hepatitis B is a virus that spreads through contact with infected blood or body fluids. Many people with the virus do not have symptoms.  The virus can lead to serious problems, such as liver disease. Some people are at higher risk than others. Your doctor will tell you if you need this shot.     Diabetes Screening:  · A test to measure sugar (glucose) in your blood is called a fasting blood sugar. Fasting means you cannot have food or drink for at least 8 hours before the test. This test can detect diabetes long before you may notice symptoms.    Glaucoma Screening:  · Glaucoma screening is performed by your eye doctor. The test measures the fluid pressure inside your eyes to determine if you have glaucoma.     Hepatitis C Screening:  · A blood test to see if you have the hepatitis C virus.  Hepatitis C attacks the liver and is a major  cause of chronic liver disease.  Medicare will cover a single screening for all adults born between 1945 & 1965, or high risk patients (people who have injected illegal drugs or people who have had blood transfusions).  High risk patients who continue to inject illegal drugs can be screened for Hepatitis C every year.    Smoking and Tobacco-Use Cessation Counseling:  · Tobacco is the single greatest cause of disease and early death in our country today. Medication and counseling together can increase a person’s chance of quitting for good.   · Medicare covers two quitting attempts per year, with four counseling sessions per attempt (eight sessions in a 12 month period)    Preventive Screening tests for Women    Screening Mammograms and Breast Exams:  · An x-ray of your breasts to check for breast cancer before you or your doctor may be able to feel it.  If breast cancer is found early it can usually be treated with success.    Pelvic Exams and Pap Tests:  · An exam to check for cervical and vaginal cancer. A Pap test is a lab test in which cells are taken from your cervix and sent to the lab to look for signs of cervical cancer. If cancer of the cervix is found early, chances for a cure are good. Testing can generally end at age 65, or if a woman has a hysterectomy for a benign condition. Your provider may recommend more frequent testing if certain abnormal results are found.    Bone Mass Measurements:  · A painless x-ray of your bone density to see if you are at risk for a broken bone. Bone density refers to the thickness of bones or how tightly the bone tissue is packed.    Preventive Screening tests for Men    Prostate Screening:  · PSA - Prostate Cancer blood test.  Experts do not recommend routine screening of healthy men with no signs or symptoms of prostate disease.  However, men should not ignore urinary symptoms, and should discuss their family history with their doctor.    *Medicare pays for many  preventive services to keep you healthy. For some of these services, you might have to pay a deductible, coinsurance, and / or copayment.  The amounts vary depending on the type of services you need and the kind of Medicare health plan you have.              Medicare Wellness Visit  Plan for Preventive Care    A good way for you to stay healthy is to use preventive care.  Medicare covers many services that can help you stay healthy.* The goal of these services is to find any health problems as quickly as possible. Finding problems early can help make them easier to treat.  Your personal plan below lists the services you may need and when they are due.     Health Maintenance Summary     Shingles Vaccine (1 of 2)  Overdue since 4/5/1976    DM/CKD Microalbumin (Yearly)  Overdue since 5/16/2019    Diabetes Foot Exam (Yearly)  Overdue since 5/16/2019    Medicare Wellness 65+ (Yearly)  Overdue since 5/16/2019    Depression Screening (Yearly)  Overdue since 5/16/2019    Pneumococcal Vaccine 65+ (2 of 2 - PPSV23)  Overdue since 5/16/2019    Diabetes A1C (Every 6 Months)  Overdue since 6/5/2019    DTaP/Tdap/Td Vaccine (1 - Tdap)  Postponed until 11/22/2019    Influenza Vaccine (1)  Next due on 9/1/2019    Diabetes Eye Exam (Yearly)  Next due on 11/1/2019    DM/CKD GFR (Yearly)  Next due on 6/6/2020    Meningococcal Vaccine   Aged Out           Preventive Care for Women and Men    Heart Screenings (Cardiovascular):  · Blood tests are used to check your cholesterol, lipid and triglyceride levels. High levels can increase your risk for heart disease and stroke. High levels can be treated with medications, diet and exercise. Lowering your levels can help keep your heart and blood vessels healthy.  Your provider will order these tests if they are needed.    · An ultrasound is done to see if you have an abdominal aortic aneurysm (AAA).  This is an enlargement of one of the main blood vessels that delivers blood to the body.   In  the [place], 9,000 deaths are caused by AAA.  You may not even know you have this problem and as many as 1 in 3 people will have a serious problem if it is not treated.  Early diagnosis allows for more effective treatment and cure.  If you have a family history of AAA or are a male age 65-75 who has smoked, you are at higher risk of an AAA.  Your provider can order this test, if needed.    Colorectal Screening:  · There are many tests that are used to check for cancer of your colon and rectum. You and your provider should discuss what test is best for you and when to have it done.  Options include:  · Screening Colonoscopy: exam of the entire colon, seen through a flexible lighted tube.  · Flexible Sigmoidoscopy: exam of the last third (sigmoid portion) of the colon and rectum, seen through a flexible lighted tube.  · Cologuard DNA stool test: a sample of your stool is used to screen for cancer and unseen blood in your stool.  · Fecal Occult Blood Test: a sample of your stool is studied to find any unseen blood    Flu Shot:  · An immunization that helps to prevent influenza (the flu). You should get this every year. The best time to get the shot is in the fall.    Pneumococcal Shot:  • Vaccines are available that can help prevent pneumococcal disease, which is any type of infection caused by Streptococcus pneumoniae bacteria.   Their use can prevent some cases of pneumonia, meningitis, and sepsis. There are two types of pneumococcal vaccines:   o Conjugate vaccines (PCV-13 or Prevnar 13®) - helps protect against the 13 types of pneumococcal bacteria that are the most common causes of serious infections in children and adults.    o Polysaccharide vaccine (PPSV23 or Sqqfruvxs61®) - helps protect against 23 types of pneumococcal bacteria for patients who are recommended to get it.  These vaccines should be given at least 12 months apart.  A booster is usually not needed.     Hepatitis B Shot:  · An immunization  that helps to protect people from getting Hepatitis B. Hepatitis B is a virus that spreads through contact with infected blood or body fluids. Many people with the virus do not have symptoms.  The virus can lead to serious problems, such as liver disease. Some people are at higher risk than others. Your doctor will tell you if you need this shot.     Diabetes Screening:  · A test to measure sugar (glucose) in your blood is called a fasting blood sugar. Fasting means you cannot have food or drink for at least 8 hours before the test. This test can detect diabetes long before you may notice symptoms.    Glaucoma Screening:  · Glaucoma screening is performed by your eye doctor. The test measures the fluid pressure inside your eyes to determine if you have glaucoma.     Hepatitis C Screening:  · A blood test to see if you have the hepatitis C virus.  Hepatitis C attacks the liver and is a major cause of chronic liver disease.  Medicare will cover a single screening for all adults born between 1945 & 1965, or high risk patients (people who have injected illegal drugs or people who have had blood transfusions).  High risk patients who continue to inject illegal drugs can be screened for Hepatitis C every year.    Smoking and Tobacco-Use Cessation Counseling:  · Tobacco is the single greatest cause of disease and early death in our country today. Medication and counseling together can increase a person’s chance of quitting for good.   · Medicare covers two quitting attempts per year, with four counseling sessions per attempt (eight sessions in a 12 month period)    Preventive Screening tests for Women    Screening Mammograms and Breast Exams:  · An x-ray of your breasts to check for breast cancer before you or your doctor may be able to feel it.  If breast cancer is found early it can usually be treated with success.    Pelvic Exams and Pap Tests:  · An exam to check for cervical and vaginal cancer. A Pap test is a lab  test in which cells are taken from your cervix and sent to the lab to look for signs of cervical cancer. If cancer of the cervix is found early, chances for a cure are good. Testing can generally end at age 65, or if a woman has a hysterectomy for a benign condition. Your provider may recommend more frequent testing if certain abnormal results are found.    Bone Mass Measurements:  · A painless x-ray of your bone density to see if you are at risk for a broken bone. Bone density refers to the thickness of bones or how tightly the bone tissue is packed.    Preventive Screening tests for Men    Prostate Screening:  · PSA - Prostate Cancer blood test.  Experts do not recommend routine screening of healthy men with no signs or symptoms of prostate disease.  However, men should not ignore urinary symptoms, and should discuss their family history with their doctor.    *Medicare pays for many preventive services to keep you healthy. For some of these services, you might have to pay a deductible, coinsurance, and / or copayment.  The amounts vary depending on the type of services you need and the kind of Medicare health plan you have.

## 2019-12-06 ENCOUNTER — HOSPITAL ENCOUNTER (OUTPATIENT)
Dept: GENERAL RADIOLOGY | Age: 54
Discharge: HOME | End: 2019-12-06
Payer: MEDICAID

## 2019-12-06 DIAGNOSIS — R14.0 BLOATING: ICD-10-CM

## 2019-12-06 PROCEDURE — 74018 RADEX ABDOMEN 1 VIEW: CPT

## 2020-05-21 ENCOUNTER — HOSPITAL ENCOUNTER (OUTPATIENT)
Dept: MRI IMAGING | Age: 55
Discharge: HOME | End: 2020-05-21
Payer: MEDICAID

## 2020-05-21 DIAGNOSIS — M75.100 ROTATOR CUFF SYNDROME: ICD-10-CM

## 2020-05-21 PROCEDURE — 73221 MRI JOINT UPR EXTREM W/O DYE: CPT

## 2020-06-11 ENCOUNTER — HOSPITAL ENCOUNTER (OUTPATIENT)
Dept: GENERAL RADIOLOGY | Age: 55
Discharge: HOME | End: 2020-06-11
Attending: NURSE PRACTITIONER
Payer: MEDICAID

## 2020-06-11 DIAGNOSIS — R14.0 BLOATING: ICD-10-CM

## 2020-06-11 PROCEDURE — 74250 X-RAY XM SM INT 1CNTRST STD: CPT

## 2021-01-03 ENCOUNTER — APPOINTMENT (OUTPATIENT)
Dept: CT IMAGING | Age: 56
End: 2021-01-03
Attending: EMERGENCY MEDICINE
Payer: MEDICAID

## 2021-01-03 ENCOUNTER — HOSPITAL ENCOUNTER (EMERGENCY)
Age: 56
Discharge: HOME | End: 2021-01-03
Attending: EMERGENCY MEDICINE
Payer: MEDICAID

## 2021-01-03 VITALS
OXYGEN SATURATION: 95 % | TEMPERATURE: 98.7 F | HEIGHT: 67 IN | SYSTOLIC BLOOD PRESSURE: 121 MMHG | BODY MASS INDEX: 23.88 KG/M2 | HEART RATE: 73 BPM | DIASTOLIC BLOOD PRESSURE: 81 MMHG | WEIGHT: 152.12 LBS | RESPIRATION RATE: 18 BRPM

## 2021-01-03 DIAGNOSIS — S51.011A LACERATION OF RIGHT ELBOW, INITIAL ENCOUNTER: ICD-10-CM

## 2021-01-03 DIAGNOSIS — S00.83XA CONTUSION OF FACE, INITIAL ENCOUNTER: Primary | ICD-10-CM

## 2021-01-03 PROCEDURE — 70486 CT MAXILLOFACIAL W/O DYE: CPT

## 2021-01-03 PROCEDURE — 99284 EMERGENCY DEPT VISIT MOD MDM: CPT

## 2021-01-03 PROCEDURE — 75810000293 HC SIMP/SUPERF WND  RPR

## 2021-01-03 RX ORDER — NAPROXEN 500 MG/1
500 TABLET ORAL
Qty: 20 TAB | Refills: 0 | Status: SHIPPED | OUTPATIENT
Start: 2021-01-03 | End: 2022-05-04

## 2021-01-03 NOTE — ED NOTES
Pt discharged by Dr Juan Saul. Pt provided with discharge instructions Rx and instructions on follow up care. Pt out of ED on wheelchair accompanied by family.

## 2021-01-03 NOTE — ED TRIAGE NOTES
Assumed care of pt from ems. Pt CC of fall/lacerations/elbow pain. Pt [id number], resp evena adn unlabored,skin warm and dry, color approp to ethnicity, swelling above rt eye npted, lacerations on rt cheek and rt elbow noted. Rates pain 8/10. Pt on monitor x 2. VSS. Call bell in reach. Will continue to monitor.  3

## 2021-01-03 NOTE — ED PROVIDER NOTES
EMERGENCY DEPARTMENT HISTORY AND PHYSICAL EXAM      Date: 1/3/2021  Patient Name: Roel Hare  Patient Age and Sex: 54 y.o. female     History of Presenting Illness     Chief Complaint   Patient presents with    Fall       History Provided By: Patient    HPI: Roel Hare is a 59-year-old female presenting for facial trauma and right elbow pain after fall. According to EMS and patient, she was drinking alcohol tonight before bed and she had gotten into bed and was rolling over to turn the light off when she rolled right into the nightstand and off the bed. States that she hit her face on the nightstand and hit her elbow on the floor. EMS reports that she does have an abrasion over her right cheek and over her right elbow. States that she is having a little bit of pain over that right cheek as well as has a bump on her forehead. Denies any loss of consciousness, nausea or vomiting associated with it. No blood thinners. There are no other complaints, changes, or physical findings at this time. PCP: Cain Gayle MD    No current facility-administered medications on file prior to encounter. Current Outpatient Medications on File Prior to Encounter   Medication Sig Dispense Refill    [DISCONTINUED] predniSONE (DELTASONE) 20 mg tablet Take 60 mg by mouth daily (with breakfast).          Past History     Past Medical History:  Past Medical History:   Diagnosis Date    Arthritis     SPINE    Chronic pain     PELVIC    Ileus ([address]     after spinal fusion    Nausea & vomiting     Nerve compression     neck and left arm    Ovarian cyst     Pap smear abnormality of cervix 06/2008    + HPV, laser treatment for cervix in 1990, colpo 6/08 negative    Pap smear abnormality of cervix 01/06/2009    +HPV    Pap smear abnormality of cervix 09/13/2010    +HPV (16,18 negative)    Pap smear abnormality of cervix 01/2011    ASCUS, HPV +, repap in 8/11 and 10/13 - WNL    Psychiatric disorder     Anxiety  Scoliosis 2011    T4-Ilium Thoracolumbar Fusion (DR Elda Iyer, DR REHMAN)       Past Surgical History:  Past Surgical History:   Procedure Laterality Date    CKC, AKA COLD KNIFE CONE      COLONOSCOPY N/A 2019    COLONOSCOPY performed by Mary Lou Benavides MD at [facility] ENDOSCOPY    HX GYN  2008    laser treatment of cervix    HX ORTHOPAEDIC  2011    T4-Ilium Thoracolumbar Fusion (DR Elda Iyer, DR REHMAN)    HX OTHER SURGICAL      ovarian cyst    HX WISDOM TEETH EXTRACTION      IMPLANT BREAST SILICONE/EQ Bilateral         [place] BREAST SURGERY PROCEDURE UNLISTED      IMPLANTS       Family History:  Family History   Problem Relation Age of Onset    Cancer Mother     Breast Cancer Mother 64    Cancer Father         THROAT    Seizures Sister     Breast Cancer Maternal Grandmother         [de-identified]    Breast Cancer Maternal Aunt         63's    Anesth Problems Neg Hx        Social History:  Social History     Tobacco Use    Smoking status: Former Smoker     Quit date:      Years since quittin.0    Smokeless tobacco: Never Used   Substance Use Topics    Alcohol use: Yes     Alcohol/week: 3.0 standard drinks     Types: 3 Standard drinks or equivalent per week     Comment: OCCASIONALLY    Drug use: No       Allergies:  No Known Allergies      Review of Systems   Review of Systems   Constitutional: Negative for chills and fever. Respiratory: Negative for cough and shortness of breath. Cardiovascular: Negative for chest pain. Gastrointestinal: Negative for abdominal pain, constipation, diarrhea, nausea and vomiting. Genitourinary: Negative for dysuria, frequency and hematuria. Musculoskeletal: Positive for arthralgias. Skin: Positive for wound. Neurological: Negative for weakness and numbness. All other systems reviewed and are negative. Physical Exam   Physical Exam  Constitutional:       General: She is not in acute distress.      Appearance: She is well-developed. Comments: Appears intoxicated   HENT:      Head: Normocephalic. Comments: Patient has a quarter sized hematoma over her right eyebrow. Already starting to have a little bit of periorbital ecchymosis and has an abrasion over her right cheek with dried blood. Nose: Nose normal.      Mouth/Throat:      Mouth: Mucous membranes are moist.   Eyes:      Extraocular Movements: Extraocular movements intact. Conjunctiva/sclera: Conjunctivae normal.   Neck:      Musculoskeletal: Normal range of motion. Cardiovascular:      Comments: Well perfused  Pulmonary:      Effort: Pulmonary effort is normal. No respiratory distress. Musculoskeletal: Normal range of motion. Skin:     Comments: Over right elbow there is a 1 cm superficial laceration no active bleeding. She is able to range that elbow without any difficulty. She has a ecchymosis over her right knee but able to range that normally as well. Neurological:      General: No focal deficit present. Mental Status: She is alert and oriented to person, place, and time. Psychiatric:         Mood and Affect: Mood normal.          Diagnostic Study Results     Labs -   No results found for this or any previous visit (from the past 12 hour(s)). Radiologic Studies -   CT MAXILLOFACIAL WO CONT   Final Result   IMPRESSION:    Right facial contusions without underlying fracture. CT Results  (Last 48 hours)               01/03/21 0315  CT MAXILLOFACIAL WO CONT Final result    Impression:  IMPRESSION:    Right facial contusions without underlying fracture. Narrative:  EXAM: CT MAXILLOFACIAL WO CONT       INDICATION: Right facial pain after injury. COMPARISON: None. CONTRAST:   None. TECHNIQUE:  Multislice helical CT of the facial bones was performed in the axial   plane without intravenous contrast administration. Coronal and sagittal   reformations were generated.   CT dose reduction was achieved through use of a   standardized protocol tailored for this examination and automatic exposure   control for dose modulation. FINDINGS:       Bones: There is no fracture or other osseous abnormality. Paranasal sinuses: Clear. Orbits: The globes, optic nerves, and extraocular muscles are within normal   limits. .       Base of brain and soft tissues: Right forehead and right maxillary preseptal   soft tissue contusions are mild. .               CXR Results  (Last 48 hours)    None            Medical Decision Making   I am the first provider for this patient. I reviewed the vital signs, available nursing notes, past medical history, past surgical history, family history and social history. Vital Signs-Reviewed the patient's vital signs. Patient Vitals for the past 12 hrs:   Temp Pulse Resp BP SpO2   01/03/21 0244 98.7 °F (37.1 °C) 73 18 (!) 166/83 96 %       Records Reviewed: Nursing Notes and Old Medical Records    Provider Notes (Medical Decision Making):   Patient presents with facial trauma. No evidence of pathologies needing emergent interventions such as globe rupture, retrobulbar hematoma, auricular hematoma, septal hematoma, CSF leak, LeFort fractures, nerve involvement. Will get CT face to r/o facial bone fractures. For her elbow superficial laceration it can be Dermabond it as it already comes together pretty well. ED Course:   Initial assessment performed. The patients presenting problems have been discussed, and they are in agreement with the care plan formulated and outlined with them. I have encouraged them to ask questions as they arise throughout their visit. ED Course as of Jan 03 1749   Davina Fuchs Jan 03, 2021   3831 Procedure Note - Laceration Repair:  2:57 AM  Procedure by Lupe Almendarez. Complexity: simple  1cm linear laceration to arm  was irrigated with   saline. wound was explored with the following results: No foreign bodies found, No tendon laceration seen.   The wound was repaired with Dermabond. The wound was closed with good hemostasis and approximation. Sterile dressing applied. Estimated blood loss: none  The procedure took 1-15 minutes, and pt tolerated well. [JS]      ED Course User Index  [JS] Rupal Boykin MD     Critical Care Time:   0    Disposition:  Discharge Note:  The patient has been re-evaluated and is ready for discharge. Reviewed available results with patient. Counseled patient on diagnosis and care plan. Patient has expressed understanding, and all questions have been answered. Patient agrees with plan and agrees to follow up as recommended, or to return to the ED if their symptoms worsen. Discharge instructions have been provided and explained to the patient, along with reasons to return to the ED. PLAN:  Current Discharge Medication List      START taking these medications    Details   naproxen (NAPROSYN) 500 mg tablet Take 1 Tab by mouth every twelve (12) hours as needed for Pain. Qty: 20 Tab, Refills: 0           2. Follow-up Information     Follow up With Specialties Details Why Contact Info    Ciara Pedro MD Family Medicine  As needed [address] [facility] [address] [phone]          3. Return to ED if worse     Diagnosis     Clinical Impression:   1. Contusion of face, initial encounter    2. Laceration of right elbow, initial encounter        Attestations:    Damien Bowman M.D. Please note that this dictation was completed with [employer], the computer voice recognition software. Quite often unanticipated grammatical, syntax, homophones, and other interpretive errors are inadvertently transcribed by the computer software. Please disregard these errors. Please excuse any errors that have escaped final proofreading. Thank you.

## 2021-02-04 ENCOUNTER — TRANSCRIBE ORDER (OUTPATIENT)
Dept: REGISTRATION | Age: 56
End: 2021-02-04

## 2021-02-04 ENCOUNTER — HOSPITAL ENCOUNTER (OUTPATIENT)
Dept: GENERAL RADIOLOGY | Age: 56
Discharge: HOME | End: 2021-02-04
Payer: MEDICAID

## 2021-02-04 DIAGNOSIS — S19.9XXA INJURY OF NECK: ICD-10-CM

## 2021-02-04 DIAGNOSIS — S09.93XD: Primary | ICD-10-CM

## 2021-02-04 DIAGNOSIS — S09.93XD: ICD-10-CM

## 2021-02-04 PROCEDURE — 72040 X-RAY EXAM NECK SPINE 2-3 VW: CPT

## 2021-02-04 PROCEDURE — 70140 X-RAY EXAM OF FACIAL BONES: CPT

## 2021-05-27 ENCOUNTER — HOSPITAL ENCOUNTER (OUTPATIENT)
Dept: GENERAL RADIOLOGY | Age: 56
Discharge: HOME | End: 2021-05-27
Payer: MEDICAID

## 2021-05-27 ENCOUNTER — TRANSCRIBE ORDER (OUTPATIENT)
Dept: REGISTRATION | Age: 56
End: 2021-05-27

## 2021-05-27 DIAGNOSIS — M54.6 ACUTE RIGHT-SIDED THORACIC BACK PAIN: ICD-10-CM

## 2021-05-27 DIAGNOSIS — M54.6 ACUTE RIGHT-SIDED THORACIC BACK PAIN: Primary | ICD-10-CM

## 2021-05-27 PROCEDURE — 72084 X-RAY EXAM ENTIRE SPI 6/> VW: CPT

## 2021-06-10 NOTE — TELEPHONE ENCOUNTER
I called pt back, pt states she is having left shoulder pain, numbness and tingling in her left arm that is radiating down her arm and into her fingers, the pain/numbness/tingling does go away and comes back, it stated on Monday 9/17/18 and has lasted all week, she has been using tylenol and heating pad, per Rosanna Feliz, watch and alternate between tylenol and ibuprofen, she does have a history of nerve compression left arm and neck, she has not seen an ortho md in years, she will watch, alternate between tylenol and ibuprofen, and if it continues she will make an appt with either her PCP or her ortho md Billing Type: Third-Party Bill

## 2021-07-28 ENCOUNTER — HOSPITAL ENCOUNTER (OUTPATIENT)
Dept: GENERAL RADIOLOGY | Age: 56
Discharge: HOME | End: 2021-07-28
Payer: MEDICAID

## 2021-07-28 ENCOUNTER — TRANSCRIBE ORDER (OUTPATIENT)
Dept: REGISTRATION | Age: 56
End: 2021-07-28

## 2021-07-28 DIAGNOSIS — M54.6 PAIN IN THORACIC SPINE: Primary | ICD-10-CM

## 2021-07-28 DIAGNOSIS — M54.6 PAIN IN THORACIC SPINE: ICD-10-CM

## 2021-07-28 PROCEDURE — 72072 X-RAY EXAM THORAC SPINE 3VWS: CPT

## 2021-09-01 ENCOUNTER — TRANSCRIBE ORDER (OUTPATIENT)
Dept: SCHEDULING | Age: 56
End: 2021-09-01

## 2021-09-01 DIAGNOSIS — Z98.1 HISTORY OF LUMBAR FUSION: Primary | ICD-10-CM

## 2021-09-01 DIAGNOSIS — M54.50 LEFT LOW BACK PAIN: ICD-10-CM

## 2021-09-01 DIAGNOSIS — M54.2 CERVICALGIA: ICD-10-CM

## 2021-09-01 DIAGNOSIS — M24.80 CREPITUS OF CERVICAL SPINE: ICD-10-CM

## 2021-09-13 ENCOUNTER — HOSPITAL ENCOUNTER (OUTPATIENT)
Dept: CT IMAGING | Age: 56
Discharge: HOME | End: 2021-09-13
Attending: PHYSICIAN ASSISTANT
Payer: MEDICAID

## 2021-09-13 DIAGNOSIS — M24.80 CREPITUS OF CERVICAL SPINE: ICD-10-CM

## 2021-09-13 DIAGNOSIS — M54.50 LEFT LOW BACK PAIN: ICD-10-CM

## 2021-09-13 DIAGNOSIS — M54.2 CERVICALGIA: ICD-10-CM

## 2021-09-13 DIAGNOSIS — Z98.1 HISTORY OF LUMBAR FUSION: ICD-10-CM

## 2021-09-13 PROCEDURE — 72128 CT CHEST SPINE W/O DYE: CPT

## 2021-09-13 PROCEDURE — 72131 CT LUMBAR SPINE W/O DYE: CPT

## 2021-11-14 ENCOUNTER — TRANSCRIBE ORDER (OUTPATIENT)
Dept: SCHEDULING | Age: 56
End: 2021-11-14

## 2021-11-14 DIAGNOSIS — Z12.2 SCREENING FOR LUNG CANCER: Primary | ICD-10-CM

## 2021-11-14 DIAGNOSIS — F17.210 SMOKING GREATER THAN 20 PACK YEARS: ICD-10-CM

## 2021-11-19 ENCOUNTER — TRANSCRIBE ORDER (OUTPATIENT)
Dept: SCHEDULING | Age: 56
End: 2021-11-19

## 2021-11-19 DIAGNOSIS — Z12.2 SCREENING FOR LUNG CANCER: Primary | ICD-10-CM

## 2021-11-19 DIAGNOSIS — F17.210 SMOKING GREATER THAN 20 PACK YEARS: ICD-10-CM

## 2022-02-24 ENCOUNTER — HOSPITAL ENCOUNTER (OUTPATIENT)
Dept: CT IMAGING | Age: 57
Discharge: HOME | End: 2022-02-24
Attending: NURSE PRACTITIONER
Payer: MEDICAID

## 2022-02-24 DIAGNOSIS — Z12.2 SCREENING FOR LUNG CANCER: ICD-10-CM

## 2022-02-24 DIAGNOSIS — F17.210 SMOKING GREATER THAN 20 PACK YEARS: ICD-10-CM

## 2022-02-24 PROCEDURE — 71271 CT THORAX LUNG CANCER SCR C-: CPT

## 2022-03-18 PROBLEM — N83.8 OVARIAN MASS, RIGHT: Status: ACTIVE | Noted: 2018-08-27

## 2022-03-19 PROBLEM — Z87.42 HISTORY OF ABNORMAL CERVICAL PAP SMEAR: Status: ACTIVE | Noted: 2018-08-27

## 2022-03-20 PROBLEM — D27.0 CYSTADENOMA OF RIGHT OVARY: Status: ACTIVE | Noted: 2018-09-06

## 2022-05-05 ENCOUNTER — ANESTHESIA (OUTPATIENT)
Dept: ENDOSCOPY | Age: 57
End: 2022-05-05
Payer: MEDICAID

## 2022-05-05 ENCOUNTER — HOSPITAL ENCOUNTER (OUTPATIENT)
Age: 57
Setting detail: OUTPATIENT SURGERY
Discharge: HOME | End: 2022-05-05
Attending: INTERNAL MEDICINE | Admitting: INTERNAL MEDICINE
Payer: MEDICAID

## 2022-05-05 ENCOUNTER — ANESTHESIA EVENT (OUTPATIENT)
Dept: ENDOSCOPY | Age: 57
End: 2022-05-05
Payer: MEDICAID

## 2022-05-05 VITALS
SYSTOLIC BLOOD PRESSURE: 133 MMHG | OXYGEN SATURATION: 98 % | HEIGHT: 67 IN | HEART RATE: 60 BPM | RESPIRATION RATE: 11 BRPM | DIASTOLIC BLOOD PRESSURE: 69 MMHG | TEMPERATURE: 97.8 F | WEIGHT: 145.8 LBS | BODY MASS INDEX: 22.88 KG/M2

## 2022-05-05 PROCEDURE — 2709999900 HC NON-CHARGEABLE SUPPLY: Performed by: INTERNAL MEDICINE

## 2022-05-05 PROCEDURE — 76060000031 HC ANESTHESIA FIRST 0.5 HR: Performed by: INTERNAL MEDICINE

## 2022-05-05 PROCEDURE — 74011000250 HC RX REV CODE- 250: Performed by: ANESTHESIOLOGY

## 2022-05-05 PROCEDURE — 74011250636 HC RX REV CODE- 250/636: Performed by: INTERNAL MEDICINE

## 2022-05-05 PROCEDURE — 74011250636 HC RX REV CODE- 250/636: Performed by: ANESTHESIOLOGY

## 2022-05-05 PROCEDURE — 76040000019: Performed by: INTERNAL MEDICINE

## 2022-05-05 RX ORDER — SODIUM CHLORIDE 9 MG/ML
75 INJECTION, SOLUTION INTRAVENOUS CONTINUOUS
Status: DISCONTINUED | OUTPATIENT
Start: 2022-05-05 | End: 2022-05-05 | Stop reason: HOSPADM

## 2022-05-05 RX ORDER — SODIUM CHLORIDE 0.9 % (FLUSH) 0.9 %
5-40 SYRINGE (ML) INJECTION EVERY 8 HOURS
Status: DISCONTINUED | OUTPATIENT
Start: 2022-05-05 | End: 2022-05-05 | Stop reason: HOSPADM

## 2022-05-05 RX ORDER — GLYCOPYRROLATE 0.2 MG/ML
INJECTION INTRAMUSCULAR; INTRAVENOUS AS NEEDED
Status: DISCONTINUED | OUTPATIENT
Start: 2022-05-05 | End: 2022-05-05 | Stop reason: HOSPADM

## 2022-05-05 RX ORDER — SODIUM CHLORIDE 0.9 % (FLUSH) 0.9 %
5-40 SYRINGE (ML) INJECTION AS NEEDED
Status: DISCONTINUED | OUTPATIENT
Start: 2022-05-05 | End: 2022-05-05 | Stop reason: HOSPADM

## 2022-05-05 RX ORDER — LIDOCAINE HYDROCHLORIDE 20 MG/ML
INJECTION, SOLUTION EPIDURAL; INFILTRATION; INTRACAUDAL; PERINEURAL AS NEEDED
Status: DISCONTINUED | OUTPATIENT
Start: 2022-05-05 | End: 2022-05-05 | Stop reason: HOSPADM

## 2022-05-05 RX ORDER — DEXTROMETHORPHAN/PSEUDOEPHED 2.5-7.5/.8
1.2 DROPS ORAL
Status: DISCONTINUED | OUTPATIENT
Start: 2022-05-05 | End: 2022-05-05 | Stop reason: HOSPADM

## 2022-05-05 RX ORDER — NALOXONE HYDROCHLORIDE 0.4 MG/ML
0.4 INJECTION, SOLUTION INTRAMUSCULAR; INTRAVENOUS; SUBCUTANEOUS
Status: DISCONTINUED | OUTPATIENT
Start: 2022-05-05 | End: 2022-05-05 | Stop reason: HOSPADM

## 2022-05-05 RX ORDER — FLUMAZENIL 0.1 MG/ML
0.2 INJECTION INTRAVENOUS
Status: DISCONTINUED | OUTPATIENT
Start: 2022-05-05 | End: 2022-05-05 | Stop reason: HOSPADM

## 2022-05-05 RX ORDER — ATROPINE SULFATE 0.1 MG/ML
0.5 INJECTION INTRAVENOUS
Status: DISCONTINUED | OUTPATIENT
Start: 2022-05-05 | End: 2022-05-05 | Stop reason: HOSPADM

## 2022-05-05 RX ORDER — EPINEPHRINE 0.1 MG/ML
1 INJECTION INTRACARDIAC; INTRAVENOUS
Status: DISCONTINUED | OUTPATIENT
Start: 2022-05-05 | End: 2022-05-05 | Stop reason: HOSPADM

## 2022-05-05 RX ORDER — PROPOFOL 10 MG/ML
INJECTION, EMULSION INTRAVENOUS AS NEEDED
Status: DISCONTINUED | OUTPATIENT
Start: 2022-05-05 | End: 2022-05-05 | Stop reason: HOSPADM

## 2022-05-05 RX ADMIN — SODIUM CHLORIDE 75 ML/HR: 9 INJECTION, SOLUTION INTRAVENOUS at 10:56

## 2022-05-05 RX ADMIN — PROPOFOL 250 MG: 10 INJECTION, EMULSION INTRAVENOUS at 11:22

## 2022-05-05 RX ADMIN — GLYCOPYRROLATE 0.2 MG: 0.2 INJECTION, SOLUTION INTRAMUSCULAR; INTRAVENOUS at 11:07

## 2022-05-05 RX ADMIN — LIDOCAINE HYDROCHLORIDE 40 MG: 20 INJECTION, SOLUTION EPIDURAL; INFILTRATION; INTRACAUDAL; PERINEURAL at 11:04

## 2022-05-05 NOTE — PROCEDURES
NAME:  Paradise Suarez   :   1965   MRN:   [id number]     Date/Time:  2022 11:24 AM    Colonoscopy Operative Report    Procedure Type:   Colonoscopy --screening     Indications:     Personal history of colon polyps (screening only)  Pre-operative Diagnosis: see indication above  Post-operative Diagnosis:  See findings below  :  Edin Persaud MD  Referring Provider: --Srinivas Brandt NP    Exam:  Airway: clear, no airway problems anticipated  Heart: RRR, without gallops or rubs  Lungs: clear bilaterally without wheezes, crackles, or rhonchi  Abdomen: soft, nontender, nondistended, bowel sounds present  Mental Status: awake, alert and oriented to person, place and time    Sedation:  MAC anesthesia Propofol  Procedure Details:  After informed consent was obtained with all risks and benefits of procedure explained and preoperative exam completed, the patient was taken to the endoscopy suite and placed in the left lateral decubitus position. Upon sequential sedation as per above, a digital rectal exam was performed demonstrating internal hemorrhoids. The Olympus videocolonoscope  was inserted in the rectum and carefully advanced to the cecum, which was identified by the ileocecal valve and appendiceal orifice. The quality of preparation was good. The colonoscope was slowly withdrawn with careful evaluation between folds. Retroflexion in the rectum was completed demonstrating internal hemorrhoids. Findings:  1. Normal colonoscopy through to the cecum  2. Medium sized internal hemorrhoids seen on retroflexion    Specimen Removed:  None  Complications: None. EBL:  None. Impression:    1. Normal colonoscopy through to the cecum  2. Medium sized internal hemorrhoids seen on retroflexion    Recommendations:   1. Repeat colonoscopy in 5 years for surveillance     Discharge Disposition:  Home in the company of a  when able to ambulate.       Kori Cai MD

## 2022-05-05 NOTE — ANESTHESIA POSTPROCEDURE EVALUATION
Procedure(s):  COLONOSCOPY.    total IV anesthesia    Anesthesia Post Evaluation        Patient location during evaluation: PACU  Note status: Adequate. Level of consciousness: responsive to verbal stimuli and sleepy but conscious  Pain management: satisfactory to patient  Airway patency: patent  Anesthetic complications: no  Cardiovascular status: acceptable  Respiratory status: acceptable  Hydration status: acceptable  Comments: +Post-Anesthesia Evaluation and Assessment    Patient: Marly Tate MRN: [id number]  SSN: [id number]   YOB: 1965  Age: 62 y.o. Sex: female      Cardiovascular Function/Vital Signs    /69   Pulse 60   Temp 36.6 °C (97.8 °F)   Resp 11   Ht 5' 7\" (1.702 m)   Wt 66.1 kg (145 lb 12.8 oz)   SpO2 98%   Breastfeeding No   BMI 22.84 kg/m²     Patient is status post Procedure(s):  COLONOSCOPY. Nausea/Vomiting: Controlled. Postoperative hydration reviewed and adequate. Pain:  Pain Scale 1: Numeric (0 - 10) (05/05/22 1148)  Pain Intensity 1: 0 (05/05/22 1148)   Managed. Neurological Status: At baseline. Mental Status and Level of Consciousness: Arousable. Pulmonary Status:   O2 Device: None (Room air) (05/05/22 1148)   Adequate oxygenation and airway patent. Complications related to anesthesia: None    Post-anesthesia assessment completed. No concerns. Signed By: Dez Jo DO    5/5/2022  Post anesthesia nausea and vomiting:  controlled      INITIAL Post-op Vital signs:   Vitals Value Taken Time   /69 05/05/22 1148   Temp 36.6 °C (97.8 °F) 05/05/22 1133   Pulse 59 05/05/22 1149   Resp 17 05/05/22 1149   SpO2 97 % 05/05/22 1149   Vitals shown include unvalidated device data.

## 2022-05-05 NOTE — ANESTHESIA PREPROCEDURE EVALUATION
Anesthetic History     PONV          Review of Systems / Medical History  Patient summary reviewed, nursing notes reviewed and pertinent labs reviewed    Pulmonary  Within defined limits                 Neuro/Psych         Psychiatric history     Cardiovascular  Within defined limits                Exercise tolerance: >4 METS     GI/Hepatic/Renal  Within defined limits              Endo/Other  Within defined limits      Arthritis     Other Findings   Comments: Alcohol use: Yes; 12.0 standard drinks per week  Drug use: Marijuana      Scoliosis  Chronic pain               Physical Exam    Airway  Mallampati: II  TM Distance: > 6 cm  Neck ROM: normal range of motion   Mouth opening: Normal     Cardiovascular  Regular rate and rhythm,  S1 and S2 normal,  no murmur, click, rub, or gallop             Dental    Dentition: Upper braces and Lower braces     Pulmonary  Breath sounds clear to auscultation               Abdominal  GI exam deferred       Other Findings            Anesthetic Plan    ASA: 2  Anesthesia type: total IV anesthesia and MAC          Induction: Intravenous  Anesthetic plan and risks discussed with: Patient      Propofol MAC

## 2022-05-05 NOTE — H&P
Gastroenterology Outpatient History and Physical    Patient: Aiden Amador    Physician: Lore Landau, MD    Chief Complaint: H/o colon polyps  History of Present Illness: No GI complaints    History:  Past Medical History:   Diagnosis Date    Arthritis     SPINE    Ileus ([address]     after spinal fusion    Nerve compression     neck and left arm    Ovarian cyst     Pap smear abnormality of cervix 2008    + HPV, laser treatment for cervix in , colpo  negative    Pap smear abnormality of cervix 2009    +HPV    Pap smear abnormality of cervix 2010    +HPV (16,18 negative)    Pap smear abnormality of cervix 2011    ASCUS, HPV +, repap in  and 10/13 - WNL    PONV (postoperative nausea and vomiting)     Psychiatric disorder     Anxiety    Scoliosis 2011    T4-Ilium Thoracolumbar Fusion (DR Tray Adrian, DR SHARPS)      Past Surgical History:   Procedure Laterality Date    CKC, AKA COLD KNIFE CONE      COLONOSCOPY N/A 2019    COLONOSCOPY performed by Cat Sarmiento MD at [facility] ENDOSCOPY    HX GYN      laser treatment of cervix    HX LUMBAR FUSION      HX ORTHOPAEDIC  2011    T4-Ilium Thoracolumbar Fusion (DR Tray Adrian, DR REHMAN)    HX OTHER SURGICAL      ovarian cyst    HX WISDOM TEETH EXTRACTION      IMPLANT BREAST SILICONE/EQ Bilateral         [place] BREAST SURGERY PROCEDURE UNLISTED      IMPLANTS      Social History     Socioeconomic History    Marital status: SINGLE   Tobacco Use    Smoking status: Former Smoker     Packs/day: 0.50     Years: 35.00     Pack years: 17.50     Quit date:      Years since quittin.3    Smokeless tobacco: Never Used   Vaping Use    Vaping Use: Never used   Substance and Sexual Activity    Alcohol use:  Yes     Alcohol/week: 12.0 standard drinks     Types: 3 Standard drinks or equivalent, 9 Shots of liquor per week     Comment: 2-3 a couple nights per weeks    Drug use: Yes     Types: Marijuana Comment: CBD oil      Family History   Problem Relation Age of Onset    Cancer Mother         breast    Breast Cancer Mother 64    Other Mother         post polio    Cataract Mother     Cancer Father         THROAT    Seizures Sister     Breast Cancer Maternal Grandmother         [de-identified]    Breast Cancer Maternal Aunt         60's    Anesth Problems Neg Hx       Patient Active Problem List   Diagnosis Code    Scoliosis M41.9    Chronic pain G89.29    Arthritis M19.90    Ovarian mass, right N83.8    History of abnormal cervical Pap smear Z87.42    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 8XXA       Allergies: No Known Allergies  Medications:   Prior to Admission medications    Medication Sig Start Date End Date Taking? Authorizing Provider   mv-min/vit C/glut/lysine/hb124 (IMMUNE SUPPORT PO) Take  by mouth as needed. Yes Provider, Historical   COLLAGEN by Does Not Apply route as needed. Yes Provider, Historical     Physical Exam:   Vital Signs: Blood pressure 119/61, pulse 68, temperature 98 °F (36.7 °C), resp. rate 12, height 5' 7\" (1.702 m), weight 66.1 kg (145 lb 12.8 oz), SpO2 97 %, not currently breastfeeding.   General: well developed, well nourished   HEENT: unremarkable   Heart: regular rhythm no mumur    Lungs: clear   Abdominal:  benign   Neurological: unremarkable   Extremities: no edema     Findings/Diagnosis: H/o colon polyps  Plan of Care/Planned Procedure: Colonoscopy with conscious/deep sedation    Signed:  Kathy Marie MD 5/5/2022

## 2022-05-05 NOTE — PROGRESS NOTES
Marly Tate  1965  [id number]    Situation:  Verbal report received from: Chon Gillette RN  Procedure: Procedure(s):  COLONOSCOPY    Background:    Preoperative diagnosis: Gastroesophageal reflux disease, unspecified whether esophagitis present [K21.9]  Bloating [R14.0]  Personal history of colonic polyps [Z86.010]  Postoperative diagnosis: colon - hemorrhoids      :  Dr. Yuki Walters  Assistant(s): Endoscopy Technician-1: Nicho [facility]  Endoscopy RN-1: Tammie Talavera RN    Specimens: * No specimens in log *  H. Pylori  no    Assessment:  Intra-procedure medications     Anesthesia gave intra-procedure sedation and medications, see anesthesia flow sheet yes    Intravenous fluids: NS@ Cecilia Plater     Vital signs stable yes    Abdominal assessment: round and soft     Recommendation:  Discharge patient per MD order yes  Return to floor n/a  Family or Friend Zuleima, daughter  Permission to share finding with family or friend yes

## 2022-05-05 NOTE — PROGRESS NOTES
Endoscope was pre-cleaned at the bedside immediately following procedure by Gaye Galeazzi ET    Medications     glycopyrrolate 0.2 mg/mL (mg)     Date/Time Rate/Dose/Volume Action Route Admin User Audit       05/05/22  1107 0.2 mg Given IntraVENous Gemma Pill M, DO edited           lidocaine (PF) 2% (mg)     Date/Time Rate/Dose/Volume Action Route Admin User Audit       05/05/22  1104 40 mg Given IntraVENous Gemma Pill M, DO            propofol 10 mg/mL (mg)     Date/Time Rate/Dose/Volume Action Route Admin User Audit       05/05/22  1122 250 mg Given IntraVENous Gemma Pill DO LALO      Comment: titrated over case            0.9% sodium chloride infusion (mL)     Date/Time Rate/Dose/Volume Action Route Admin User Audit       05/05/22  1101 75 mL/hr Rate Verify IntraVENous Gemma Pill LALO DO       [place], [place]      Comment: Switch to gravity       1123 400 mL Restarted IntraVENous Homer Holman DO                Glasses returned to patient.

## 2022-05-05 NOTE — DISCHARGE INSTRUCTIONS
More Lamb  [id number]  1965    COLON DISCHARGE INSTRUCTIONS  Discomfort:  Redness at IV site- apply warm compress to area; if redness or soreness persist- contact your physician  There may be a slight amount of blood passed from the rectum  Gaseous discomfort- walking, belching will help relieve any discomfort  You may not operate a vehicle for 12 hours  You may not engage in an occupation involving machinery or appliances for rest of today  You may not drink alcoholic beverages for at least 12 hours  Avoid making any critical decisions for at least 24 hour  DIET:   Regular diet. - however -  remember your colon is empty and a heavy meal will produce gas. Avoid these foods:  vegetables, fried / greasy foods, carbonated drinks for today  MEDICATION:  Per Medication Reconciliation       ACTIVITY:  You may not resume your normal daily activities until tomorrow AM; it is recommended that you spend the remainder of the day resting -  avoid any strenuous activity. CALL M.D. ANY SIGN OF:   Increasing pain, nausea, vomiting  Abdominal distension (swelling)  New increased bleeding (oral or rectal)  Fever (chills)  Pain in chest area  Bloody discharge from nose or mouth  Shortness of breath    You may  take any Advil, Aspirin, Ibuprofen, Motrin, Aleve, or Goodys for 10 days, ONLY  Tylenol as needed for pain. IMPRESSION:  Impression:    1. Normal colonoscopy through to the cecum  2. Medium sized internal hemorrhoids seen on retroflexion    Recommendations:   1.  Repeat colonoscopy in 5 years for surveillance     Follow-up Instructions:  Telephone # [phone]      Ant Alvarez MD

## 2022-12-15 NOTE — PERIOP NOTES
Endoscope was pre-cleaned at the bedside immediately following procedure by Jenna KWOK Quality 130: Documentation Of Current Medications In The Medical Record: Current Medications Documented Quality 431: Preventive Care And Screening: Unhealthy Alcohol Use - Screening: Patient not identified as an unhealthy alcohol user when screened for unhealthy alcohol use using a systematic screening method Quality 226: Preventive Care And Screening: Tobacco Use: Screening And Cessation Intervention: Patient screened for tobacco use and is an ex/non-smoker Detail Level: Detailed

## 2023-01-19 ENCOUNTER — OFFICE VISIT (OUTPATIENT)
Dept: ORTHOPEDIC SURGERY | Age: 58
End: 2023-01-19
Payer: MEDICAID

## 2023-01-19 VITALS — BODY MASS INDEX: 24.17 KG/M2 | HEIGHT: 67 IN | WEIGHT: 154 LBS

## 2023-01-19 DIAGNOSIS — R26.89 POOR BALANCE: ICD-10-CM

## 2023-01-19 DIAGNOSIS — Z98.1 HISTORY OF LUMBAR SPINAL FUSION: ICD-10-CM

## 2023-01-19 DIAGNOSIS — M54.2 NECK PAIN: Primary | ICD-10-CM

## 2023-01-19 DIAGNOSIS — M50.30 DDD (DEGENERATIVE DISC DISEASE), CERVICAL: ICD-10-CM

## 2023-01-19 DIAGNOSIS — Z98.1 HISTORY OF THORACIC SPINAL FUSION: ICD-10-CM

## 2023-01-19 DIAGNOSIS — M47.812 CERVICAL SPONDYLOSIS: ICD-10-CM

## 2023-01-19 RX ORDER — CYCLOBENZAPRINE HCL 10 MG
10 TABLET ORAL
Qty: 30 TABLET | Refills: 0 | Status: SHIPPED | OUTPATIENT
Start: 2023-01-19

## 2023-01-19 RX ORDER — METHYLPREDNISOLONE 4 MG/1
TABLET ORAL
Qty: 1 DOSE PACK | Refills: 0 | Status: SHIPPED | OUTPATIENT
Start: 2023-01-19

## 2023-01-19 NOTE — PROGRESS NOTES
Sharad Villatoro (: 1965) is a 62 y.o. female, established patient, here for evaluation of the following chief complaint(s):  Neck Pain       ASSESSMENT/PLAN:      1. Neck pain  -     XR SPINE CERV 4 OR 5 V; Future  -     methylPREDNISolone (MEDROL DOSEPACK) 4 mg tablet; Per dose pack instructions, Normal, Disp-1 Dose Pack, R-0  -     cyclobenzaprine (FLEXERIL) 10 mg tablet; Take 1 Tablet by mouth three (3) times daily (with meals). , Normal, Disp-30 Tablet, R-0  -     REFERRAL TO PHYSICAL THERAPY  2. DDD (degenerative disc disease), cervical  -     methylPREDNISolone (MEDROL DOSEPACK) 4 mg tablet; Per dose pack instructions, Normal, Disp-1 Dose Pack, R-0  -     cyclobenzaprine (FLEXERIL) 10 mg tablet; Take 1 Tablet by mouth three (3) times daily (with meals). , Normal, Disp-30 Tablet, R-0  -     REFERRAL TO PHYSICAL THERAPY  3. History of lumbar spinal fusion  -     methylPREDNISolone (MEDROL DOSEPACK) 4 mg tablet; Per dose pack instructions, Normal, Disp-1 Dose Pack, R-0  -     cyclobenzaprine (FLEXERIL) 10 mg tablet; Take 1 Tablet by mouth three (3) times daily (with meals). , Normal, Disp-30 Tablet, R-0  -     REFERRAL TO PHYSICAL THERAPY  4. History of thoracic spinal fusion  -     methylPREDNISolone (MEDROL DOSEPACK) 4 mg tablet; Per dose pack instructions, Normal, Disp-1 Dose Pack, R-0  -     cyclobenzaprine (FLEXERIL) 10 mg tablet; Take 1 Tablet by mouth three (3) times daily (with meals). , Normal, Disp-30 Tablet, R-0  -     REFERRAL TO PHYSICAL THERAPY  5. Cervical spondylosis  -     methylPREDNISolone (MEDROL DOSEPACK) 4 mg tablet; Per dose pack instructions, Normal, Disp-1 Dose Pack, R-0  -     cyclobenzaprine (FLEXERIL) 10 mg tablet; Take 1 Tablet by mouth three (3) times daily (with meals). , Normal, Disp-30 Tablet, R-0  -     REFERRAL TO PHYSICAL THERAPY  6.  Poor balance  -     methylPREDNISolone (MEDROL DOSEPACK) 4 mg tablet; Per dose pack instructions, Normal, Disp-1 Dose Pack, R-0  - cyclobenzaprine (FLEXERIL) 10 mg tablet; Take 1 Tablet by mouth three (3) times daily (with meals). , Normal, Disp-30 Tablet, R-0  -     REFERRAL TO PHYSICAL THERAPY        Have discussed the patient's diagnosis and radiographic findings at length and have answered all patient questions to her satisfaction. Have sent a prescription for tramadol and steroids electronically to the patient's preferred pharmacy. Have provided the patient with a prescription for outpatient physical therapy for core strengthening, modalities, cervical traction, balance training and instruction in a home exercise program. Will plan on seeing the patient back for reevaluation in 4-6 weeks time should symptoms persist or worsen. The patient understands and agrees to the treatment plan as outlined above. SUBJECTIVE/OBJECTIVE:    Junior Vázquez (: 1965) is a 62 y.o. female. T4-ilium posterior lumbar thoracic fusion-2011    The patient presents today for posterior cervical neck and upper back pain. Patient states yesterday she was sitting on a stool when one of the legs of the stool broke and she fell to the ground. Patient states she fell out of bed 2 years ago and feels like that may be when neck pain started. After her last fall 2 years ago she [facility] a local physical therapy outfit which concluded around 2022. States temporizing improvement of symptoms following physical therapy. States soreness in her upper back and neck without radiation to the upper extremities. Patient states intermittent muscle spasms in the back. Patient has been taking Advil and Tylenol on an as-needed basis and states after the falls her pain level was severe. Symptoms have modestly improved today. States she has a history of poor balance and being \"wobbly\".     No Known Allergies    Current Outpatient Medications   Medication Sig    methylPREDNISolone (MEDROL DOSEPACK) 4 mg tablet Per dose pack instructions    cyclobenzaprine (FLEXERIL) 10 mg tablet Take 1 Tablet by mouth three (3) times daily (with meals). mv-min/vit C/glut/lysine/hb124 (IMMUNE SUPPORT PO) Take  by mouth as needed. COLLAGEN by Does Not Apply route as needed. No current facility-administered medications for this visit. Social History     Socioeconomic History    Marital status: SINGLE     Spouse name: Not on file    Number of children: Not on file    Years of education: Not on file    Highest education level: Not on file   Occupational History    Not on file   Tobacco Use    Smoking status: Former     Packs/day: 0.50     Years: 35.00     Pack years: 17.50     Types: Cigarettes     Quit date:      Years since quittin.0    Smokeless tobacco: Never   Vaping Use    Vaping Use: Never used   Substance and Sexual Activity    Alcohol use:  Yes     Alcohol/week: 12.0 standard drinks     Types: 3 Standard drinks or equivalent, 9 Shots of liquor per week     Comment: 2-3 a couple nights per weeks    Drug use: Not Currently     Comment: CBD oil    Sexual activity: Not Currently   Other Topics Concern    Not on file   Social History Narrative    Not on file     Social Determinants of Health     Financial Resource Strain: Not on file   Food Insecurity: Not on file   Transportation Needs: Not on file   Physical Activity: Not on file   Stress: Not on file   Social Connections: Not on file   Intimate Partner Violence: Not on file   Housing Stability: Not on file       Past Surgical History:   Procedure Laterality Date    CKC,  [facility]    COLONOSCOPY N/A 2019    COLONOSCOPY performed by Yoon Worthington MD at [facility] ENDOSCOPY    COLONOSCOPY N/A 2022    COLONOSCOPY performed by Yoon Worthington MD at [facility] ENDOSCOPY    HX GYN      laser treatment of cervix    HX LUMBAR FUSION      HX ORTHOPAEDIC  2011    T4-Ilium Thoracolumbar Fusion (DR David Amador, DR REHMAN)    HX OTHER SURGICAL      ovarian cyst    HX WISDOM TEETH EXTRACTION      IMPLANT BREAST SILICONE/EQ Bilateral         NC UNLISTED PROCEDURE BREAST  1997    IMPLANTS       Family History   Problem Relation Age of Onset    Cancer Mother         breast    Breast Cancer Mother 64    Other Mother         post polio    Cataract Mother     Cancer Father         THROAT    Seizures Sister     Breast Cancer Maternal Grandmother         [de-identified]    Breast Cancer Maternal Aunt         63's    Anesth Problems Neg Hx         OB History          2    Para   2    Term                AB        Living             SAB        IAB        Ectopic        Molar        Multiple        Live Births              Obstetric Comments   Menarche:  13. LMP: current. # of Children:  2. Age at Delivery of First Child:  35.   Hysterectomy/oophorectomy:  NO/NO. Breast Bx:  no.  Hx of Breast Feeding:  yes. BCP:  Yes, IUD. Hormone therapy:  no.                       REVIEW OF SYSTEMS:    Patient denies any recent fevers, chills, nausea, vomiting, chest pain or shortness of breath. Vitals:    Ht 5' 7\" (1.702 m)   Wt 154 lb (69.9 kg)   BMI 24.12 kg/m²    Body mass index is 24.12 kg/m². PHYSICAL EXAM:    Patient is alert and oriented x3 and in no acute distress. Patient ambulates with normal gait without gait aids. Sensation to light touch in all major nerve distributions in the upper extremities is intact and symmetric. Manual motor testing of the major muscle groups of the upper extremities including  strength, hand intrinsics, wrist flexion/extension, biceps/triceps, deltoid intact and symmetric. Deep tendon reflexes in the biceps, triceps and brachioradialis are +1/4 and symmetric.   Range of motion of the cervical spine mildly diminished and painful at terminal range of motion with cervical extension and lateral cervical rotation full forward flexion chin to chest, cervical extension to 60 degrees, lateral rotation to 80 degrees bilaterally, lateral bending to 30 degrees bilaterally. Negative Kirstin's, negative Spurling's maneuver. There is tenderness to palpation of the cervical paraspinals and upper trapezius bilaterally. Physical examination of the lower extremities demonstrates sensory testing in all major nerve distributions in the lower extremities is intact and symmetric. Manual motor testing of the major muscle groups of the lower extremities including dorsiflexion/EHL/ plantarflexion, knee flexion/extension, hip flexion/extension/abduction/ adduction is intact and symmetric in the bilateral lower extremities. Deep tendon reflexes +1/4 and symmetric in the bilateral knees and ankles. Hip range of motion is pain-free bilaterally. Negative straight leg raise bilaterally. No evidence of clonus bilaterally. Babinski's downgoing and symmetric bilaterally. Distal pulses intact and symmetric bilaterally. There is no tenderness to palpation of the thoracic, lumbar or sacral regions. IMAGING:    Results from Appointment encounter on 01/19/23    XR SPINE CERV 4 OR 5 V    Narrative  AP, lateral, flexion and extension view digital radiographs of the cervical spine obtained in the office today reviewed with the patient demonstrates moderate intervertebral disc height loss spondylosis at C5-6-severe intervertebral disc height loss and spondylosis at C6-C7. Overall cervical alignment is neutral bordering on mildly kyphotic. There are no gross motion segments seen on flexion/extension views. There is no evidence of fracture, lytic lesion or acute bony abnormality.          Orders Placed This Encounter    XR SPINE CERV 4 OR 5 V     6     Standing Status:   Future     Number of Occurrences:   1     Standing Expiration Date:   1/19/2024    REFERRAL TO PHYSICAL THERAPY     Referral Priority:   Routine     Referral Type:   PT/OT/ST     Referral Reason:   Specialty Services Required     Requested Specialty:   Physical Therapy Number of Visits Requested:   1    methylPREDNISolone (MEDROL DOSEPACK) 4 mg tablet     Sig: Per dose pack instructions     Dispense:  1 Dose Pack     Refill:  0    cyclobenzaprine (FLEXERIL) 10 mg tablet     Sig: Take 1 Tablet by mouth three (3) times daily (with meals). Dispense:  30 Tablet     Refill:  0          No follow-ups on file. Dr. Francheska Alvarenga was available for immediate consultation as needed. An electronic signature was used to authenticate this note.   -- Carol Harman PA-C

## 2023-01-27 ENCOUNTER — TELEPHONE (OUTPATIENT)
Dept: ORTHOPEDIC SURGERY | Age: 58
End: 2023-01-27

## 2023-01-27 DIAGNOSIS — M47.812 CERVICAL SPONDYLOSIS: Primary | ICD-10-CM

## 2023-01-27 NOTE — TELEPHONE ENCOUNTER
Patient was seen on 01-. Patient states that she is having increased neck pain today. Patient has a yeast infection and doesn't want to take the steroids. She also states that she's unable to tolerate the Cyclobenzaprine because it's making her feel like she has a hang over the next morning. Patient requesting a different pain medication.

## 2023-01-28 RX ORDER — TRAMADOL HYDROCHLORIDE 50 MG/1
50 TABLET ORAL
Qty: 28 TABLET | Refills: 0 | Status: SHIPPED | OUTPATIENT
Start: 2023-01-28 | End: 2023-02-04

## 2023-02-09 DIAGNOSIS — M47.812 CERVICAL SPONDYLOSIS: Primary | ICD-10-CM

## 2023-02-09 DIAGNOSIS — M50.30 DDD (DEGENERATIVE DISC DISEASE), CERVICAL: ICD-10-CM

## 2023-02-10 RX ORDER — TRAMADOL HYDROCHLORIDE 50 MG/1
50 TABLET ORAL
Qty: 30 TABLET | Refills: 0 | Status: SHIPPED | OUTPATIENT
Start: 2023-02-10 | End: 2023-02-18

## 2023-03-09 ENCOUNTER — OFFICE VISIT (OUTPATIENT)
Dept: ORTHOPEDIC SURGERY | Age: 58
End: 2023-03-09
Payer: MEDICAID

## 2023-03-09 DIAGNOSIS — M54.12 CERVICAL RADICULITIS: ICD-10-CM

## 2023-03-09 DIAGNOSIS — M47.812 CERVICAL SPONDYLOSIS: ICD-10-CM

## 2023-03-09 DIAGNOSIS — Z98.1 HISTORY OF THORACIC SPINAL FUSION: ICD-10-CM

## 2023-03-09 DIAGNOSIS — M54.2 NECK PAIN: Primary | ICD-10-CM

## 2023-03-09 DIAGNOSIS — Z98.1 HISTORY OF LUMBAR SPINAL FUSION: ICD-10-CM

## 2023-03-09 PROCEDURE — 99214 OFFICE O/P EST MOD 30 MIN: CPT | Performed by: PHYSICIAN ASSISTANT

## 2023-03-09 RX ORDER — GABAPENTIN 100 MG/1
300 CAPSULE ORAL
Qty: 90 CAPSULE | Refills: 0 | Status: SHIPPED | OUTPATIENT
Start: 2023-03-09

## 2023-03-09 RX ORDER — TRAMADOL HYDROCHLORIDE 50 MG/1
50 TABLET ORAL
Qty: 28 TABLET | Refills: 0 | Status: SHIPPED | OUTPATIENT
Start: 2023-03-09 | End: 2023-03-16

## 2023-03-09 NOTE — PROGRESS NOTES
Alice Peng (: 1965) is a 62 y.o. female, patient, here for evaluation of the following chief complaint(s): No chief complaint on file. ASSESSMENT/PLAN:    Below is the assessment and plan developed based on review of pertinent history, physical exam, labs, studies, and medications. Have discussed the patient's diagnosis and radiographic findings at length and have answered all patient questions to her satisfaction. Have sent a prescription for tramadol and Neurontin electronically to the patient's preferred pharmacy. Given the patient's ongoing pain despite conservative management have referred the patient for MRI to assess for stenosis. Will plan on seeing the patient back for reevaluation and further treatment recommendations once imaging results are available for review. The patient understands and agrees to the treatment plan as outlined above. 1. Neck pain  -     traMADoL (Ultram) 50 mg tablet; Take 1 Tablet by mouth every six (6) hours as needed for Pain for up to 7 days. Max Daily Amount: 200 mg. Indications: pain, Normal, Disp-28 Tablet, R-0Supervising physician: Tamela Contreras MD [id number]  -     gabapentin (NEURONTIN) 100 mg capsule; Take 3 Capsules by mouth nightly. Max Daily Amount: 300 mg. Take 1 capsule by mouth 1 hour before bedtime. After 5 days may add second dose if needed. May titrate up to three capsules at bedtime if needed for nerve pain. Indications: neuropathic pain, Normal, Disp-90 Capsule, R-0  -     MRI CERV SPINE WO CONT; Future  2. Cervical spondylosis  -     traMADoL (Ultram) 50 mg tablet; Take 1 Tablet by mouth every six (6) hours as needed for Pain for up to 7 days. Max Daily Amount: 200 mg. Indications: pain, Normal, Disp-28 Tablet, R-0Supervising physician: Tamela Contreras MD [id number]  -     gabapentin (NEURONTIN) 100 mg capsule; Take 3 Capsules by mouth nightly. Max Daily Amount: 300 mg. Take 1 capsule by mouth 1 hour before bedtime.  After 5 days may add second dose if needed. May titrate up to three capsules at bedtime if needed for nerve pain. Indications: neuropathic pain, Normal, Disp-90 Capsule, R-0  -     MRI CERV SPINE WO CONT; Future  3. Cervical radiculitis  -     traMADoL (Ultram) 50 mg tablet; Take 1 Tablet by mouth every six (6) hours as needed for Pain for up to 7 days. Max Daily Amount: 200 mg. Indications: pain, Normal, Disp-28 Tablet, R-0Supervising physician: Raj Rivera MD [id number]  -     gabapentin (NEURONTIN) 100 mg capsule; Take 3 Capsules by mouth nightly. Max Daily Amount: 300 mg. Take 1 capsule by mouth 1 hour before bedtime. After 5 days may add second dose if needed. May titrate up to three capsules at bedtime if needed for nerve pain. Indications: neuropathic pain, Normal, Disp-90 Capsule, R-0  -     MRI CERV SPINE WO CONT; Future  4. History of lumbar spinal fusion  -     traMADoL (Ultram) 50 mg tablet; Take 1 Tablet by mouth every six (6) hours as needed for Pain for up to 7 days. Max Daily Amount: 200 mg. Indications: pain, Normal, Disp-28 Tablet, R-0Supervising physician: Raj Rivera MD [id number]  -     MRI CERV SPINE WO CONT; Future  5. History of thoracic spinal fusion  -     traMADoL (Ultram) 50 mg tablet; Take 1 Tablet by mouth every six (6) hours as needed for Pain for up to 7 days. Max Daily Amount: 200 mg. Indications: pain, Normal, Disp-28 Tablet, R-0Supervising physician: Raj Rivera MD [id number]  -     MRI CERV SPINE WO CONT; Future      No follow-ups on file. SUBJECTIVE/OBJECTIVE:    Petra Goldberg (: 1965) is a 62 y.o. female who returns for follow-up evaluation of cervical neck pain. Patient states symptoms have been present for 2 years. Pain started when she fell out of bed. Patient had a recent fall off of a stool in 2023. The patient has posterior neck and upper back pain. Patient denies radiation of symptoms to the upper extremities.   The patient states intermittent muscle spasms in the upper back. The patient describes her pain as severe and constant in nature. The patient has been taking tramadol and occasional Flexeril on an as-needed basis and states working with physical therapy has been beneficial.  She continues to have residual posterior neck and upper back pain. The patient has a history of T4-ilium posterior thoracolumbar fusion performed 09/2011 with [facility]. The patient states that the lower back is doing well at this time. Patient states she does suffer with mild gait/balance disturbance. Pain Assessment  1/19/2023   Location of Pain Neck   Location Modifiers Posterior   Severity of Pain 7   Frequency of Pain Constant   Limiting Behavior Yes       Imaging:    XR Results (most recent):  Results from Appointment encounter on 01/19/23    XR SPINE CERV 4 OR 5 V    Narrative  AP, lateral, flexion and extension view digital radiographs of the cervical spine obtained in the office today reviewed with the patient demonstrates moderate intervertebral disc height loss spondylosis at C5-6-severe intervertebral disc height loss and spondylosis at C6-C7. Overall cervical alignment is neutral bordering on mildly kyphotic. There are no gross motion segments seen on flexion/extension views. There is no evidence of fracture, lytic lesion or acute bony abnormality. MRI Results (most recent):  Results from [place] encounter on 05/21/20    MRI SHOULDER LT WO CONT    Narrative  EXAM: MRI SHOULDER LT WO CONT    INDICATION: Left shoulder pain, rotator cuff syndrome M 75.100    COMPARISON: None    TECHNIQUE: Axial proton density fat-saturated; oblique coronal T1, STIR, T2  fat-saturated, and proton density fat-saturated; and oblique sagittal T2  fat-saturated MRI of the left shoulder . CONTRAST: None. FINDINGS: A.C. joint: Mild-moderate osteoarthrosis with small inferior  osteophyte of distal clavicle.  Anterior acromion process type: 2    Bone marrow: Normal.    Alignment: Superior subluxation of humeral head. Joint fluid: Small effusions of glenohumeral joint and subacromial subdeltoid  bursa. Rotator cuff tendons: Near complete tearing of supraspinatus tendon, with small  intact remnant posteriorly. Tendon retraction to superomedial humeral head which  is greater anteriorly. Bursal sided partial-thickness tearing is also shown at  the anterior infraspinatus tendon. Biceps tendon: Intact origin. Mild intracapsular and extracapsular tendinopathy. Anatomic extracapsular location. Muscles: No edema or atrophy. Glenoid labrum: Intact. Glenohumeral joint capsule: within normal limits. Glenohumeral articular cartilage: Mild osteoarthrosis. Soft tissue mass: None. Impression  IMPRESSION: Near-complete full-thickness tearing of supraspinatus tendon with  retraction. No Known Allergies    Current Outpatient Medications   Medication Sig    traMADoL (Ultram) 50 mg tablet Take 1 Tablet by mouth every six (6) hours as needed for Pain for up to 7 days. Max Daily Amount: 200 mg. Indications: pain    gabapentin (NEURONTIN) 100 mg capsule Take 3 Capsules by mouth nightly. Max Daily Amount: 300 mg. Take 1 capsule by mouth 1 hour before bedtime. After 5 days may add second dose if needed. May titrate up to three capsules at bedtime if needed for nerve pain. Indications: neuropathic pain    methylPREDNISolone (MEDROL DOSEPACK) 4 mg tablet Per dose pack instructions    mv-min/vit C/glut/lysine/hb124 (IMMUNE SUPPORT PO) Take  by mouth as needed. COLLAGEN by Does Not Apply route as needed. No current facility-administered medications for this visit.        Past Medical History:   Diagnosis Date    Arthritis     SPINE    Ileus ([facility] [address]     after spinal fusion    Nerve compression     neck and left arm    Ovarian cyst     Pap smear abnormality of cervix 06/2008    + HPV, laser treatment for cervix in 1990, colpo 6/08 negative Pap smear abnormality of cervix 2009    +HPV    Pap smear abnormality of cervix 2010    +HPV (16,18 negative)    Pap smear abnormality of cervix 2011    ASCUS, HPV +, repap in  and 10/13 - WNL    PONV (postoperative nausea and vomiting)     Psychiatric disorder     Anxiety    Scoliosis 2011    T4-Ilium Thoracolumbar Fusion (DR Ruthann Fiore, DR REHMAN)        Past Surgical History:   Procedure Laterality Date    CKC, AKA COLD KNIFE CONE  1992    COLONOSCOPY N/A 2019    COLONOSCOPY performed by Ashvin Lay MD at [facility] ENDOSCOPY    COLONOSCOPY N/A 2022    COLONOSCOPY performed by Ashvin Lay MD at [facility] ENDOSCOPY    HX GYN      laser treatment of cervix    HX LUMBAR FUSION      HX ORTHOPAEDIC  2011    T4-Ilium Thoracolumbar Fusion (DR Ruthann Fiore, DR REHMAN)    HX OTHER SURGICAL      ovarian cyst    HX WISDOM TEETH EXTRACTION      IMPLANT BREAST SILICONE/EQ Bilateral         AR UNLISTED PROCEDURE BREAST  1997    IMPLANTS       Family History   Problem Relation Age of Onset    Cancer Mother         breast    Breast Cancer Mother 64    Other Mother         post polio    Cataract Mother     Cancer Father         THROAT    Seizures Sister     Breast Cancer Maternal Grandmother         [de-identified]    Breast Cancer Maternal Aunt         63's    Anesth Problems Neg Hx         Social History     Tobacco Use    Smoking status: Former     Packs/day: 0.50     Years: 35.00     Pack years: 17.50     Types: Cigarettes     Quit date:      Years since quittin.1    Smokeless tobacco: Never   Vaping Use    Vaping Use: Never used   Substance Use Topics    Alcohol use: Yes     Alcohol/week: 12.0 standard drinks     Types: 3 Standard drinks or equivalent, 9 Shots of liquor per week     Comment: 2-3 a couple nights per weeks    Drug use: Not Currently     Comment: CBD oil        Review of Systems   Musculoskeletal:  Positive for gait problem, neck pain and neck stiffness.    All other systems reviewed and are negative. Vitals: There were no vitals taken for this visit. There is no height or weight on file to calculate BMI. Ortho Exam     Physical Exam    Neurologic  Overall Assessment of Muscle Strength and Tone reveals  Upper Extremities - Right Deltoid - 5/5. Left Deltoid - 5/5. Right Bicep - 5/5. Left Bicep - 5/5. Right Tricep - 5/5. Left Tricep - 5/5. Right Wrist Extensors - 5/5. Left Wrist Extensors - 5/5. Right Wrist Flexors - 5/5. Left Wrist Flexors - 5/5. Right Intrinsics - 5/5. Left Intrinsics - 5/5. General Assessment of Reflexes  Right Hand - Schroeder's sign is negative in the right hand. Left Hand - Schroeder's sign is negative in the left hand. Reflexes (Dermatomes)  2/2 Normal - Left Bicep (C5-6), Left Tricep (C7-8), Left Brachioradialis (C5-6), Right Bicep (C5-6), Right Tricep (C7-8) and Right Brachioradialis (C5-6). Musculoskeletal  Global Assessment  Examination of related systems reveals - well-developed, well-nourished, in no acute distress, alert and oriented x 3 and normal coordination. Gait and Station - normal gait and station and normal posture. Cervical Spine - Evaluation of related systems reveals - no lymphadenopathy and neurovascularly intact bilaterally. No obvious deformities. Sensation to light touch is intact in all upper extremity dermatomes. Inspection and Palpation - Tenderness - mild and localized posterior cervical and upper trapezius on the right. ROM - Flexion - 70 *. Right Lateral Flexion - 30 °. Left Lateral Flexion - 30 °. Extension - 30 °. Right Rotation - 70 °. Left Rotation - 70 °. Cervical Spine - Functional Testing - Foraminal Compression/Spurling's Test negative. An electronic signature was used to authenticate this note.   -- Jeff Jensen PA-C

## 2023-03-10 NOTE — PROGRESS NOTES
1. Have you been to the ER, urgent care clinic since your last visit? Hospitalized since your last visit? No    2. Have you seen or consulted any other health care providers outside of the [address] [place] [address] since your last visit? Include any pap smears or colon screening.  No    Chief Complaint   Patient presents with    Neck Pain

## 2023-03-22 ENCOUNTER — HOSPITAL ENCOUNTER (OUTPATIENT)
Dept: MRI IMAGING | Age: 58
Discharge: HOME | End: 2023-03-22
Attending: PHYSICIAN ASSISTANT
Payer: MEDICAID

## 2023-03-22 DIAGNOSIS — Z98.1 HISTORY OF LUMBAR SPINAL FUSION: ICD-10-CM

## 2023-03-22 DIAGNOSIS — M54.2 NECK PAIN: ICD-10-CM

## 2023-03-22 DIAGNOSIS — Z98.1 HISTORY OF THORACIC SPINAL FUSION: ICD-10-CM

## 2023-03-22 DIAGNOSIS — M54.12 CERVICAL RADICULITIS: ICD-10-CM

## 2023-03-22 DIAGNOSIS — M47.812 CERVICAL SPONDYLOSIS: ICD-10-CM

## 2023-03-22 PROCEDURE — 72141 MRI NECK SPINE W/O DYE: CPT

## 2023-12-27 ENCOUNTER — HOSPITAL ENCOUNTER (EMERGENCY)
Facility: HOSPITAL | Age: 58
Discharge: HOME | End: 2023-12-27

## 2023-12-27 VITALS
TEMPERATURE: 98.2 F | HEART RATE: 61 BPM | DIASTOLIC BLOOD PRESSURE: 70 MMHG | WEIGHT: 138 LBS | RESPIRATION RATE: 18 BRPM | OXYGEN SATURATION: 97 % | SYSTOLIC BLOOD PRESSURE: 149 MMHG | BODY MASS INDEX: 21.66 KG/M2 | HEIGHT: 67 IN

## 2024-12-19 ENCOUNTER — HOSPITAL ENCOUNTER (OUTPATIENT)
Facility: HOSPITAL | Age: 59
Discharge: HOME | End: 2024-12-22
Payer: MEDICAID

## 2024-12-19 DIAGNOSIS — M54.2 CERVICAL PAIN (NECK): ICD-10-CM

## 2024-12-19 PROCEDURE — 72141 MRI NECK SPINE W/O DYE: CPT

## 2025-05-15 ENCOUNTER — TRANSCRIBE ORDERS (OUTPATIENT)
Facility: HOSPITAL | Age: 60
End: 2025-05-15

## 2025-05-15 DIAGNOSIS — Z91.89 OTHER SPECIFIED PERSONAL RISK FACTORS, NOT ELSEWHERE CLASSIFIED: Primary | ICD-10-CM

## (undated) DEVICE — BLOCK BITE ENDOSCP AD 21 MM W/ DIL BLU LF DISP

## (undated) DEVICE — TOWEL 4 PLY TISS 19X30 SUE WHT

## (undated) DEVICE — Device

## (undated) DEVICE — SET ADMIN 16ML TBNG L100IN 2 Y INJ SITE IV PIGGY BK DISP

## (undated) DEVICE — SHEAR HARMONIC ACET 5MMX36CM -- ACE PLUS

## (undated) DEVICE — CATH FOL TY IC BAG 16FR 2000ML -- CONVERT TO ITEM 363158

## (undated) DEVICE — FORCEPS BX L160CM DIA8MM GRSP DISECT CUP TIP NONLOCKING ROT

## (undated) DEVICE — SYR 50ML LR LCK 1ML GRAD NSAF --

## (undated) DEVICE — 1200 GUARD II KIT W/5MM TUBE W/O VAC TUBE: Brand: GUARDIAN

## (undated) DEVICE — 3000CC GUARDIAN II: Brand: GUARDIAN

## (undated) DEVICE — STERILE NEOPRENE POWDER-FREE SURGICAL GLOVES WITH NITRILE COATING: Brand: PROTEXIS

## (undated) DEVICE — KENDALL SCD EXPRESS SLEEVES, KNEE LENGTH, MEDIUM: Brand: KENDALL SCD

## (undated) DEVICE — TK® TI-KNOT® DEVICE: Brand: TK® TI-KNOT®

## (undated) DEVICE — SOLUTION IV 1000ML 0.9% SOD CHL

## (undated) DEVICE — CATH IV AUTOGRD BC PNK 20GA 25 -- INSYTE

## (undated) DEVICE — SYR 10ML LUER LOK 1/5ML GRAD --

## (undated) DEVICE — DRAPE,REIN 53X77,STERILE: Brand: MEDLINE

## (undated) DEVICE — PAD SANIT NPKN 4IN GRD

## (undated) DEVICE — NEEDLE SCLERO 25GA L240CM OD0.51MM ID0.24MM EXTN L4MM SHTH

## (undated) DEVICE — SURGICAL PROCEDURE PACK GYN LAPAROSCOPY CUST SMH LF

## (undated) DEVICE — NEONATAL-ADULT SPO2 SENSOR: Brand: NELLCOR

## (undated) DEVICE — BASIN EMSIS 16OZ GRAPHITE PLAS KID SHP MOLD GRAD FOR ORAL

## (undated) DEVICE — REM POLYHESIVE ADULT PATIENT RETURN ELECTRODE: Brand: VALLEYLAB

## (undated) DEVICE — STERILE POLYISOPRENE POWDER-FREE SURGICAL GLOVES WITH EMOLLIENT COATING: Brand: PROTEXIS

## (undated) DEVICE — BAG SPEC BIOHZRD 10 X 10 IN --

## (undated) DEVICE — STRAINER URIN CALC RNL MSH -- CONVERT TO ITEM 357634

## (undated) DEVICE — HYPODERMIC SAFETY NEEDLE: Brand: MAGELLAN

## (undated) DEVICE — (D)PREP SKN CHLRAPRP APPL 26ML -- CONVERT TO ITEM 371833

## (undated) DEVICE — HANDLE LT SNAP ON ULT DURABLE LENS FOR TRUMPF ALC DISPOSABLE

## (undated) DEVICE — TK® QUICK LOAD® UNIT: Brand: TK® QUICK LOAD®

## (undated) DEVICE — MEDI-VAC YANK SUCT HNDL W/TPRD BULBOUS TIP: Brand: CARDINAL HEALTH

## (undated) DEVICE — FORCEPS BX L240CM JAW DIA2.8MM L CAP W/ NDL MIC MESH TOOTH

## (undated) DEVICE — SYR 3ML LL TIP 1/10ML GRAD --

## (undated) DEVICE — CATH IV AUTOGRD BC BLU 22GA 25 -- INSYTE

## (undated) DEVICE — TRAY PREP DRY W/ PREM GLV 2 APPL 6 SPNG 2 UNDPD 1 OVERWRAP

## (undated) DEVICE — CONTAINER SPEC 20 ML LID NEUT BUFF FORMALIN 10 % POLYPR STS

## (undated) DEVICE — SNARE ENDOSCP M L240CM W27MM SHTH DIA2.4MM CHN 2.8MM OVL

## (undated) DEVICE — Z DISCONTINUED PER MEDLINE LINE GAS SAMPLING O2/CO2 LNG AD 13 FT NSL W/ TBNG FILTERLINE

## (undated) DEVICE — DEVON™ KNEE AND BODY STRAP 60" X 3" (1.5 M X 7.6 CM): Brand: DEVON

## (undated) DEVICE — SUTURE MCRYL SZ 4-0 L27IN ABSRB UD L19MM PS-2 1/2 CIR PRIM Y426H

## (undated) DEVICE — SOLIDIFIER FLD 2OZ 1500CC N DISINF IN BTL DISP SAFESORB

## (undated) DEVICE — GLOVE SURG SZ 75 L1212IN FNGR THK138MIL BRN LTX FREE

## (undated) DEVICE — NEEDLE HYPO 18GA L1.5IN PNK S STL HUB POLYPR SHLD REG BVL

## (undated) DEVICE — TRAP SUC MUCOUS 70ML -- MEDICHOICE MEDLINE

## (undated) DEVICE — UNIVERSAL FIXATION CANNULA: Brand: VERSAONE

## (undated) DEVICE — SOLIDIFIER MEDC 1200ML -- CONVERT TO 356117

## (undated) DEVICE — CLIP INT L235CM WRK CHAN DIA2.8MM OPN 11MM LCK MECHANISM MR

## (undated) DEVICE — KENDALL RADIOLUCENT FOAM MONITORING ELECTRODE RECTANGULAR SHAPE: Brand: KENDALL

## (undated) DEVICE — NON-REM POLYHESIVE PATIENT RETURN ELECTRODE: Brand: VALLEYLAB

## (undated) DEVICE — BLADELESS OPTICAL TROCAR WITH FIXATION CANNULA: Brand: VERSAPORT

## (undated) DEVICE — INFECTION CONTROL KIT SYS

## (undated) DEVICE — ELECTRODE,RADIOTRANSLUCENT,FOAM,5PK: Brand: MEDLINE

## (undated) DEVICE — APPLICATOR BNDG 1MM ADH PREMIERPRO EXOFIN

## (undated) DEVICE — BLADELESS OPTICAL TROCAR WITH FIXATION CANNULA: Brand: VERSAONE

## (undated) DEVICE — (D)AGENT INJECTN ELEVIEW 10ML -- DISC BY MFG